# Patient Record
Sex: FEMALE | Race: BLACK OR AFRICAN AMERICAN | NOT HISPANIC OR LATINO | Employment: OTHER | RURAL
[De-identification: names, ages, dates, MRNs, and addresses within clinical notes are randomized per-mention and may not be internally consistent; named-entity substitution may affect disease eponyms.]

---

## 2023-09-17 ENCOUNTER — HOSPITAL ENCOUNTER (EMERGENCY)
Facility: HOSPITAL | Age: 23
Discharge: HOME | End: 2023-09-17
Payer: MEDICAID

## 2023-09-17 VITALS
RESPIRATION RATE: 20 BRPM | BODY MASS INDEX: 19.81 KG/M2 | OXYGEN SATURATION: 100 % | TEMPERATURE: 95 F | DIASTOLIC BLOOD PRESSURE: 92 MMHG | HEART RATE: 89 BPM | HEIGHT: 64 IN | SYSTOLIC BLOOD PRESSURE: 152 MMHG | WEIGHT: 116 LBS

## 2023-09-17 DIAGNOSIS — A08.4 VIRAL GASTROENTERITIS: Primary | ICD-10-CM

## 2023-09-17 LAB
ALBUMIN SERPL BCP-MCNC: 3.8 G/DL (ref 3.5–5)
ALBUMIN/GLOB SERPL: 0.8 {RATIO}
ALP SERPL-CCNC: 111 U/L (ref 52–144)
ALT SERPL W P-5'-P-CCNC: 39 U/L (ref 13–56)
ANION GAP SERPL CALCULATED.3IONS-SCNC: 15 MMOL/L (ref 7–16)
AST SERPL W P-5'-P-CCNC: 23 U/L (ref 15–37)
BACTERIA #/AREA URNS HPF: ABNORMAL /HPF
BASOPHILS # BLD AUTO: 0.01 K/UL (ref 0–0.2)
BASOPHILS NFR BLD AUTO: 0.2 % (ref 0–1)
BILIRUB SERPL-MCNC: 0.3 MG/DL (ref ?–1.2)
BILIRUB UR QL STRIP: NEGATIVE
BUN SERPL-MCNC: 8 MG/DL (ref 7–18)
BUN/CREAT SERPL: 11 (ref 6–20)
CALCIUM SERPL-MCNC: 8.7 MG/DL (ref 8.5–10.1)
CHLORIDE SERPL-SCNC: 97 MMOL/L (ref 98–107)
CLARITY UR: CLEAR
CO2 SERPL-SCNC: 26 MMOL/L (ref 21–32)
COLOR UR: YELLOW
CREAT SERPL-MCNC: 0.72 MG/DL (ref 0.55–1.02)
DIFFERENTIAL METHOD BLD: ABNORMAL
EGFR (NO RACE VARIABLE) (RUSH/TITUS): 123 ML/MIN/1.73M2
EOSINOPHIL # BLD AUTO: 0.17 K/UL (ref 0–0.5)
EOSINOPHIL NFR BLD AUTO: 2.7 % (ref 1–4)
ERYTHROCYTE [DISTWIDTH] IN BLOOD BY AUTOMATED COUNT: 19.5 % (ref 11.5–14.5)
FLUAV AG UPPER RESP QL IA.RAPID: NEGATIVE
FLUBV AG UPPER RESP QL IA.RAPID: NEGATIVE
GLOBULIN SER-MCNC: 4.6 G/DL (ref 2–4)
GLUCOSE SERPL-MCNC: 284 MG/DL (ref 74–106)
GLUCOSE UR STRIP-MCNC: 500 MG/DL
HCT VFR BLD AUTO: 33 % (ref 38–47)
HGB BLD-MCNC: 10.5 G/DL (ref 12–16)
KETONES UR STRIP-SCNC: 40 MG/DL
LACTATE SERPL-SCNC: 2.6 MMOL/L (ref 0.4–2)
LEUKOCYTE ESTERASE UR QL STRIP: NEGATIVE
LYMPHOCYTES # BLD AUTO: 1.32 K/UL (ref 1–4.8)
LYMPHOCYTES NFR BLD AUTO: 21 % (ref 27–41)
MCH RBC QN AUTO: 22.3 PG (ref 27–31)
MCHC RBC AUTO-ENTMCNC: 31.8 G/DL (ref 32–36)
MCV RBC AUTO: 70.2 FL (ref 80–96)
MONOCYTES # BLD AUTO: 0.25 K/UL (ref 0–0.8)
MONOCYTES NFR BLD AUTO: 4 % (ref 2–6)
MPC BLD CALC-MCNC: 10 FL (ref 9.4–12.4)
NEUTROPHILS # BLD AUTO: 4.54 K/UL (ref 1.8–7.7)
NEUTROPHILS NFR BLD AUTO: 72.1 % (ref 53–65)
NITRITE UR QL STRIP: NEGATIVE
PH UR STRIP: 5.5 PH UNITS
PLATELET # BLD AUTO: 216 K/UL (ref 150–400)
POTASSIUM SERPL-SCNC: 3.8 MMOL/L (ref 3.5–5.1)
PROT SERPL-MCNC: 8.4 G/DL (ref 6.4–8.2)
PROT UR QL STRIP: NEGATIVE
RBC # BLD AUTO: 4.7 M/UL (ref 4.2–5.4)
RBC # UR STRIP: ABNORMAL /UL
RBC #/AREA URNS HPF: ABNORMAL /HPF
SARS-COV+SARS-COV-2 AG RESP QL IA.RAPID: NEGATIVE
SODIUM SERPL-SCNC: 134 MMOL/L (ref 136–145)
SP GR UR STRIP: 1.01
UROBILINOGEN UR STRIP-ACNC: 0.2 MG/DL
WBC # BLD AUTO: 6.29 K/UL (ref 4.5–11)
WBC #/AREA URNS HPF: ABNORMAL /HPF

## 2023-09-17 PROCEDURE — 96372 THER/PROPH/DIAG INJ SC/IM: CPT | Performed by: NURSE PRACTITIONER

## 2023-09-17 PROCEDURE — 87426 SARSCOV CORONAVIRUS AG IA: CPT | Performed by: NURSE PRACTITIONER

## 2023-09-17 PROCEDURE — 85025 COMPLETE CBC W/AUTO DIFF WBC: CPT | Performed by: NURSE PRACTITIONER

## 2023-09-17 PROCEDURE — 99284 EMERGENCY DEPT VISIT MOD MDM: CPT

## 2023-09-17 PROCEDURE — 80053 COMPREHEN METABOLIC PANEL: CPT | Performed by: NURSE PRACTITIONER

## 2023-09-17 PROCEDURE — 99284 EMERGENCY DEPT VISIT MOD MDM: CPT | Mod: ,,, | Performed by: NURSE PRACTITIONER

## 2023-09-17 PROCEDURE — 81001 URINALYSIS AUTO W/SCOPE: CPT | Performed by: NURSE PRACTITIONER

## 2023-09-17 PROCEDURE — 87804 INFLUENZA ASSAY W/OPTIC: CPT | Performed by: NURSE PRACTITIONER

## 2023-09-17 PROCEDURE — 83605 ASSAY OF LACTIC ACID: CPT | Performed by: NURSE PRACTITIONER

## 2023-09-17 PROCEDURE — 63600175 PHARM REV CODE 636 W HCPCS: Performed by: NURSE PRACTITIONER

## 2023-09-17 PROCEDURE — 99284 PR EMERGENCY DEPT VISIT,LEVEL IV: ICD-10-PCS | Mod: ,,, | Performed by: NURSE PRACTITIONER

## 2023-09-17 RX ORDER — ONDANSETRON 2 MG/ML
4 INJECTION INTRAMUSCULAR; INTRAVENOUS
Status: COMPLETED | OUTPATIENT
Start: 2023-09-17 | End: 2023-09-17

## 2023-09-17 RX ORDER — INSULIN ASPART 100 [IU]/ML
INJECTION, SOLUTION INTRAVENOUS; SUBCUTANEOUS
COMMUNITY
Start: 2023-08-24

## 2023-09-17 RX ORDER — ESCITALOPRAM OXALATE 10 MG/1
10 TABLET ORAL
COMMUNITY
Start: 2023-09-11

## 2023-09-17 RX ORDER — ATORVASTATIN CALCIUM 40 MG/1
40 TABLET, FILM COATED ORAL NIGHTLY
COMMUNITY
Start: 2023-05-23

## 2023-09-17 RX ORDER — AMITRIPTYLINE HYDROCHLORIDE 10 MG/1
10 TABLET, FILM COATED ORAL NIGHTLY
COMMUNITY
Start: 2023-08-27

## 2023-09-17 RX ORDER — ASPIRIN 81 MG/1
1 TABLET ORAL DAILY
COMMUNITY
Start: 2022-11-16 | End: 2023-11-16

## 2023-09-17 RX ORDER — ONDANSETRON 2 MG/ML
4 INJECTION INTRAMUSCULAR; INTRAVENOUS
Status: DISCONTINUED | OUTPATIENT
Start: 2023-09-17 | End: 2023-09-17

## 2023-09-17 RX ORDER — GABAPENTIN 300 MG/1
300 CAPSULE ORAL NIGHTLY
COMMUNITY
Start: 2023-08-27

## 2023-09-17 RX ORDER — INSULIN GLARGINE 100 [IU]/ML
INJECTION, SOLUTION SUBCUTANEOUS
COMMUNITY
Start: 2023-06-30

## 2023-09-17 RX ORDER — ONDANSETRON 4 MG/1
4 TABLET, ORALLY DISINTEGRATING ORAL EVERY 6 HOURS PRN
Qty: 12 TABLET | Refills: 0 | Status: SHIPPED | OUTPATIENT
Start: 2023-09-17

## 2023-09-17 RX ORDER — BACLOFEN 10 MG/1
10 TABLET ORAL NIGHTLY
COMMUNITY
Start: 2023-07-27

## 2023-09-17 RX ADMIN — ONDANSETRON 4 MG: 2 INJECTION INTRAMUSCULAR; INTRAVENOUS at 12:09

## 2023-09-17 NOTE — ED PROVIDER NOTES
Encounter Date: 9/17/2023       History     Chief Complaint   Patient presents with    Nausea    Vomiting    Abdominal Pain     23 y/o AAF presents per mother with c/o n/v with onset one hour pta. Has vomited x 4 and complaining of upper abdominal pain. Mom states she checked patient's BS at home and it was 390. Mom treated with Lantus 15 units and Novolog 12 units pta to ED. Mom states BS sometimes not well-controlled. Patient currently on monthly cycle.      Review of patient's allergies indicates:  No Known Allergies  Past Medical History:   Diagnosis Date    CVA (cerebral vascular accident) 07/27/2022    Diabetes mellitus     Left-sided weakness      Past Surgical History:   Procedure Laterality Date    CRANIOTOMY  07/2022     History reviewed. No pertinent family history.  Social History     Tobacco Use    Smoking status: Every Day     Current packs/day: 0.50     Types: Cigarettes    Smokeless tobacco: Never   Substance Use Topics    Alcohol use: Never    Drug use: Yes     Frequency: 14.0 times per week     Types: Marijuana     Review of Systems   Constitutional:  Negative for chills and fever.   Respiratory:  Negative for apnea, cough, choking, chest tightness, shortness of breath, wheezing and stridor.    Cardiovascular:  Negative for chest pain, palpitations and leg swelling.   Gastrointestinal:  Positive for abdominal pain, nausea and vomiting. Negative for abdominal distention, anal bleeding, blood in stool, constipation, diarrhea and rectal pain.   All other systems reviewed and are negative.      Physical Exam     Initial Vitals [09/17/23 1210]   BP Pulse Resp Temp SpO2   (!) 163/117 89 20 (!) 94.5 °F (34.7 °C) 100 %      MAP       --         Physical Exam    Vitals reviewed.  Constitutional: She appears well-developed and well-nourished. She is not diaphoretic. No distress.   HENT:   Head: Normocephalic.   Right Ear: Tympanic membrane and ear canal normal.   Left Ear: Tympanic membrane and ear canal  normal.   Nose: Nose normal. Right sinus exhibits no maxillary sinus tenderness and no frontal sinus tenderness. Left sinus exhibits no maxillary sinus tenderness and no frontal sinus tenderness.   Mouth/Throat: Uvula is midline, oropharynx is clear and moist and mucous membranes are normal.   Eyes: Conjunctivae and EOM are normal. No scleral icterus.   Neck: Neck supple.   Normal range of motion.  Cardiovascular:  Normal rate, regular rhythm, normal heart sounds and intact distal pulses.     Exam reveals no gallop and no friction rub.       No murmur heard.  Pulmonary/Chest: Breath sounds normal. No respiratory distress. She has no wheezes. She has no rhonchi. She has no rales. She exhibits no tenderness.   Abdominal: Abdomen is soft. Bowel sounds are normal. She exhibits no distension and no mass. There is no abdominal tenderness. There is no rebound and no guarding.   Musculoskeletal:         General: No tenderness. Normal range of motion.      Cervical back: Normal range of motion and neck supple.     Lymphadenopathy:     She has no cervical adenopathy.   Neurological: She is alert and oriented to person, place, and time. She has normal strength.   Skin: Skin is warm and dry. Capillary refill takes less than 2 seconds.   Psychiatric: She has a normal mood and affect. Her behavior is normal. Judgment and thought content normal.         Medical Screening Exam   See Full Note    ED Course   Procedures  Labs Reviewed   COMPREHENSIVE METABOLIC PANEL - Abnormal; Notable for the following components:       Result Value    Sodium 134 (*)     Chloride 97 (*)     Glucose 284 (*)     Total Protein 8.4 (*)     Globulin 4.6 (*)     All other components within normal limits   CBC WITH DIFFERENTIAL - Abnormal; Notable for the following components:    Hemoglobin 10.5 (*)     Hematocrit 33.0 (*)     MCV 70.2 (*)     MCH 22.3 (*)     MCHC 31.8 (*)     RDW 19.5 (*)     Neutrophils % 72.1 (*)     Lymphocytes % 21.0 (*)     All  other components within normal limits   LACTIC ACID, PLASMA - Abnormal; Notable for the following components:    Lactic Acid 2.6 (*)     All other components within normal limits   URINALYSIS, REFLEX TO URINE CULTURE - Abnormal; Notable for the following components:    Glucose,  (*)     Ketones, UA 40 (*)     Blood, UA Large (*)     All other components within normal limits   URINALYSIS, MICROSCOPIC - Abnormal; Notable for the following components:    RBC, UA Too Numerous To Count (*)     All other components within normal limits   RAPID INFLUENZA A/B - Normal   SARS ANTIGEN(VAISHNAVI) - Normal    Narrative:     Negative SARS-CoV results should not be used as the sole basis for treatment or patient management decisions; negative results should be considered in the context of a patient's recent exposures, history and the presene of clinical signs and symptoms consistent with COVID-19.  Negative results should be treated as presumptive and confirmed by molecular assay, if necessary for patient management.   CBC W/ AUTO DIFFERENTIAL    Narrative:     The following orders were created for panel order CBC auto differential.  Procedure                               Abnormality         Status                     ---------                               -----------         ------                     CBC with Differential[[id number]]       Abnormal            Final result                 Please view results for these tests on the individual orders.   LACTIC ACID, PLASMA          Imaging Results    None          Medications   ondansetron injection 4 mg (4 mg Intramuscular Given 9/17/23 1229)     Medical Decision Making  23 y/o AAF presents per mother with c/o n/v with onset one hour pta. Has vomited x 4 and complaining of upper abdominal pain. Mom states she checked patient's BS at home and it was 390. Mom treated with Lantus 15 units and Novolog 12 units pta to ED. Mom states BS sometimes not well-controlled. Patient  currently on monthly cycle.    Amount and/or Complexity of Data Reviewed  Labs: ordered.     Details: CBC: H/H 10.5/33  CMP: Sodium 134, Glucose 284  Lactate: 2.6  Covid: Negative  Flu: Negative    Risk  Prescription drug management.                               Clinical Impression:   Final diagnoses:  [A08.4] Viral gastroenteritis (Primary)        ED Disposition Condition    Discharge Stable          ED Prescriptions       Medication Sig Dispense Start Date End Date Auth. Provider    ondansetron (ZOFRAN-ODT) 4 MG TbDL Take 1 tablet (4 mg total) by mouth every 6 (six) hours as needed (nausea). 12 tablet 9/17/2023 -- Rashaad Garcia FNP          Follow-up Information    None          Rashaad Garcia FNP  09/17/23 1322       Rashaad Garcia FNP  09/17/23 1411

## 2023-09-17 NOTE — ED TRIAGE NOTES
Pt presents to the ED via POV w/ c/o N/V and abdominal pain that started 1 hour ago. Pt states she is on her monthly cycle.

## 2024-07-20 ENCOUNTER — HOSPITAL ENCOUNTER (EMERGENCY)
Facility: HOSPITAL | Age: 24
Discharge: ANOTHER HOSPITAL | End: 2024-07-21
Payer: MEDICAID

## 2024-07-20 DIAGNOSIS — E86.0 DEHYDRATION: ICD-10-CM

## 2024-07-20 DIAGNOSIS — R10.9 ABDOMINAL PAIN, UNSPECIFIED ABDOMINAL LOCATION: Primary | ICD-10-CM

## 2024-07-20 DIAGNOSIS — R11.10 VOMITING, UNSPECIFIED VOMITING TYPE, UNSPECIFIED WHETHER NAUSEA PRESENT: ICD-10-CM

## 2024-07-20 DIAGNOSIS — E10.10 TYPE 1 DIABETES MELLITUS WITH KETOACIDOSIS WITHOUT COMA: ICD-10-CM

## 2024-07-20 DIAGNOSIS — R73.9 HYPERGLYCEMIA: ICD-10-CM

## 2024-07-20 LAB
ALBUMIN SERPL BCP-MCNC: 4.9 G/DL (ref 3.5–5)
ALBUMIN/GLOB SERPL: 1 {RATIO}
ALP SERPL-CCNC: 134 U/L (ref 52–144)
ALT SERPL W P-5'-P-CCNC: 58 U/L (ref 13–56)
ANION GAP SERPL CALCULATED.3IONS-SCNC: 29 MMOL/L (ref 7–16)
AST SERPL W P-5'-P-CCNC: 43 U/L (ref 15–37)
BACTERIA #/AREA URNS HPF: ABNORMAL /HPF
BASOPHILS # BLD AUTO: 0.02 K/UL (ref 0–0.2)
BASOPHILS NFR BLD AUTO: 0.2 % (ref 0–1)
BILIRUB SERPL-MCNC: 0.5 MG/DL (ref ?–1.2)
BILIRUB UR QL STRIP: NEGATIVE
BUN SERPL-MCNC: 15 MG/DL (ref 7–18)
BUN/CREAT SERPL: 16 (ref 6–20)
CALCIUM SERPL-MCNC: 9.9 MG/DL (ref 8.5–10.1)
CHLORIDE SERPL-SCNC: 97 MMOL/L (ref 98–107)
CLARITY UR: ABNORMAL
CO2 SERPL-SCNC: 18 MMOL/L (ref 21–32)
COLOR UR: ABNORMAL
CREAT SERPL-MCNC: 0.95 MG/DL (ref 0.55–1.02)
DIFFERENTIAL METHOD BLD: ABNORMAL
EGFR (NO RACE VARIABLE) (RUSH/TITUS): 88 ML/MIN/1.73M2
EOSINOPHIL # BLD AUTO: 0 K/UL (ref 0–0.5)
EOSINOPHIL NFR BLD AUTO: 0 % (ref 1–4)
ERYTHROCYTE [DISTWIDTH] IN BLOOD BY AUTOMATED COUNT: 20 % (ref 11.5–14.5)
GLOBULIN SER-MCNC: 4.9 G/DL (ref 2–4)
GLUCOSE SERPL-MCNC: 210 MG/DL (ref 74–106)
GLUCOSE UR STRIP-MCNC: 500 MG/DL
HCG UR QL IA.RAPID: NEGATIVE
HCO3 UR-SCNC: 13.6 MMOL/L (ref 21–28)
HCT VFR BLD AUTO: 41.3 % (ref 38–47)
HGB BLD-MCNC: 13 G/DL (ref 12–16)
KETONES UR STRIP-SCNC: >=160 MG/DL
LACTATE SERPL-SCNC: 2.9 MMOL/L (ref 0.4–2)
LACTATE SERPL-SCNC: 4.2 MMOL/L (ref 0.4–2)
LEUKOCYTE ESTERASE UR QL STRIP: NEGATIVE
LIPASE SERPL-CCNC: 9 U/L (ref 16–77)
LYMPHOCYTES # BLD AUTO: 1.03 K/UL (ref 1–4.8)
LYMPHOCYTES NFR BLD AUTO: 7.8 % (ref 27–41)
MCH RBC QN AUTO: 22.5 PG (ref 27–31)
MCHC RBC AUTO-ENTMCNC: 31.5 G/DL (ref 32–36)
MCV RBC AUTO: 71.6 FL (ref 80–96)
MONOCYTES # BLD AUTO: 0.21 K/UL (ref 0–0.8)
MONOCYTES NFR BLD AUTO: 1.6 % (ref 2–6)
MPC BLD CALC-MCNC: 9.9 FL (ref 9.4–12.4)
NEUTROPHILS # BLD AUTO: 12.03 K/UL (ref 1.8–7.7)
NEUTROPHILS NFR BLD AUTO: 90.4 % (ref 53–65)
NITRITE UR QL STRIP: NEGATIVE
PCO2 BLDA: 29 MMHG (ref 35–48)
PH SMN: 7.28 [PH] (ref 7.35–7.45)
PH UR STRIP: 5.5 PH UNITS
PLATELET # BLD AUTO: 266 K/UL (ref 150–400)
PO2 BLDA: 106 MMHG (ref 83–108)
POC BASE EXCESS: -11.8 MMOL/L (ref -2–3)
POC SATURATED O2: 97 %
POTASSIUM SERPL-SCNC: 4.6 MMOL/L (ref 3.5–5.1)
PROT SERPL-MCNC: 9.8 G/DL (ref 6.4–8.2)
PROT UR QL STRIP: 100
RBC # BLD AUTO: 5.77 M/UL (ref 4.2–5.4)
RBC # UR STRIP: ABNORMAL /UL
RBC #/AREA URNS HPF: ABNORMAL /HPF
SODIUM SERPL-SCNC: 139 MMOL/L (ref 136–145)
SP GR UR STRIP: 1.02
SQUAMOUS #/AREA URNS LPF: ABNORMAL /LPF
UROBILINOGEN UR STRIP-ACNC: 0.2 MG/DL
WBC # BLD AUTO: 13.29 K/UL (ref 4.5–11)
WBC #/AREA URNS HPF: ABNORMAL /HPF

## 2024-07-20 PROCEDURE — 36600 WITHDRAWAL OF ARTERIAL BLOOD: CPT

## 2024-07-20 PROCEDURE — 85025 COMPLETE CBC W/AUTO DIFF WBC: CPT | Performed by: NURSE PRACTITIONER

## 2024-07-20 PROCEDURE — 82803 BLOOD GASES ANY COMBINATION: CPT

## 2024-07-20 PROCEDURE — 83690 ASSAY OF LIPASE: CPT | Performed by: NURSE PRACTITIONER

## 2024-07-20 PROCEDURE — 99900035 HC TECH TIME PER 15 MIN (STAT)

## 2024-07-20 PROCEDURE — 96375 TX/PRO/DX INJ NEW DRUG ADDON: CPT

## 2024-07-20 PROCEDURE — 87077 CULTURE AEROBIC IDENTIFY: CPT | Performed by: NURSE PRACTITIONER

## 2024-07-20 PROCEDURE — 94761 N-INVAS EAR/PLS OXIMETRY MLT: CPT | Mod: XB

## 2024-07-20 PROCEDURE — 25000003 PHARM REV CODE 250: Performed by: NURSE PRACTITIONER

## 2024-07-20 PROCEDURE — 96361 HYDRATE IV INFUSION ADD-ON: CPT

## 2024-07-20 PROCEDURE — 81003 URINALYSIS AUTO W/O SCOPE: CPT | Performed by: NURSE PRACTITIONER

## 2024-07-20 PROCEDURE — 80053 COMPREHEN METABOLIC PANEL: CPT | Performed by: NURSE PRACTITIONER

## 2024-07-20 PROCEDURE — 99285 EMERGENCY DEPT VISIT HI MDM: CPT | Mod: 25

## 2024-07-20 PROCEDURE — 96365 THER/PROPH/DIAG IV INF INIT: CPT

## 2024-07-20 PROCEDURE — 81025 URINE PREGNANCY TEST: CPT | Performed by: NURSE PRACTITIONER

## 2024-07-20 PROCEDURE — 93010 ELECTROCARDIOGRAM REPORT: CPT | Mod: ,,, | Performed by: INTERNAL MEDICINE

## 2024-07-20 PROCEDURE — 82962 GLUCOSE BLOOD TEST: CPT

## 2024-07-20 PROCEDURE — 96376 TX/PRO/DX INJ SAME DRUG ADON: CPT

## 2024-07-20 PROCEDURE — 99285 EMERGENCY DEPT VISIT HI MDM: CPT | Mod: ,,, | Performed by: NURSE PRACTITIONER

## 2024-07-20 PROCEDURE — 93005 ELECTROCARDIOGRAM TRACING: CPT

## 2024-07-20 PROCEDURE — 63600175 PHARM REV CODE 636 W HCPCS: Performed by: NURSE PRACTITIONER

## 2024-07-20 PROCEDURE — 83605 ASSAY OF LACTIC ACID: CPT | Performed by: NURSE PRACTITIONER

## 2024-07-20 PROCEDURE — 96374 THER/PROPH/DIAG INJ IV PUSH: CPT

## 2024-07-20 PROCEDURE — 81001 URINALYSIS AUTO W/SCOPE: CPT | Performed by: NURSE PRACTITIONER

## 2024-07-20 PROCEDURE — 87186 SC STD MICRODIL/AGAR DIL: CPT | Performed by: NURSE PRACTITIONER

## 2024-07-20 PROCEDURE — 87086 URINE CULTURE/COLONY COUNT: CPT | Performed by: NURSE PRACTITIONER

## 2024-07-20 RX ORDER — ONDANSETRON HYDROCHLORIDE 2 MG/ML
4 INJECTION, SOLUTION INTRAVENOUS
Status: COMPLETED | OUTPATIENT
Start: 2024-07-20 | End: 2024-07-20

## 2024-07-20 RX ORDER — MORPHINE SULFATE 4 MG/ML
2 INJECTION, SOLUTION INTRAMUSCULAR; INTRAVENOUS
Status: COMPLETED | OUTPATIENT
Start: 2024-07-20 | End: 2024-07-20

## 2024-07-20 RX ORDER — MORPHINE SULFATE 4 MG/ML
2 INJECTION, SOLUTION INTRAMUSCULAR; INTRAVENOUS
Status: COMPLETED | OUTPATIENT
Start: 2024-07-21 | End: 2024-07-20

## 2024-07-20 RX ADMIN — PROMETHAZINE HYDROCHLORIDE 12.5 MG: 25 INJECTION INTRAMUSCULAR; INTRAVENOUS at 11:07

## 2024-07-20 RX ADMIN — ONDANSETRON 4 MG: 2 INJECTION INTRAMUSCULAR; INTRAVENOUS at 09:07

## 2024-07-20 RX ADMIN — MORPHINE SULFATE 2 MG: 4 INJECTION INTRAVENOUS at 10:07

## 2024-07-20 RX ADMIN — MORPHINE SULFATE 2 MG: 4 INJECTION INTRAVENOUS at 11:07

## 2024-07-20 RX ADMIN — SODIUM CHLORIDE 500 ML: 9 INJECTION, SOLUTION INTRAVENOUS at 11:07

## 2024-07-20 RX ADMIN — SODIUM CHLORIDE 500 ML: 9 INJECTION, SOLUTION INTRAVENOUS at 09:07

## 2024-07-20 RX ADMIN — SODIUM CHLORIDE 500 ML: 9 INJECTION, SOLUTION INTRAVENOUS at 10:07

## 2024-07-21 ENCOUNTER — HOSPITAL ENCOUNTER (INPATIENT)
Facility: HOSPITAL | Age: 24
LOS: 2 days | Discharge: HOME | End: 2024-07-23
Attending: INTERNAL MEDICINE | Admitting: INTERNAL MEDICINE
Payer: MEDICAID

## 2024-07-21 VITALS
WEIGHT: 125 LBS | DIASTOLIC BLOOD PRESSURE: 84 MMHG | HEART RATE: 84 BPM | SYSTOLIC BLOOD PRESSURE: 157 MMHG | BODY MASS INDEX: 21.34 KG/M2 | RESPIRATION RATE: 16 BRPM | TEMPERATURE: 98 F | HEIGHT: 64 IN | OXYGEN SATURATION: 100 %

## 2024-07-21 DIAGNOSIS — R11.14 BILIOUS VOMITING WITH NAUSEA: ICD-10-CM

## 2024-07-21 DIAGNOSIS — I63.9 ISCHEMIC STROKE: ICD-10-CM

## 2024-07-21 DIAGNOSIS — E10.10 DIABETIC KETOACIDOSIS WITHOUT COMA ASSOCIATED WITH TYPE 1 DIABETES MELLITUS: Primary | ICD-10-CM

## 2024-07-21 DIAGNOSIS — E11.10 DKA (DIABETIC KETOACIDOSIS): ICD-10-CM

## 2024-07-21 DIAGNOSIS — I10 ESSENTIAL HYPERTENSION: ICD-10-CM

## 2024-07-21 DIAGNOSIS — E87.20 LACTIC ACIDOSIS: ICD-10-CM

## 2024-07-21 DIAGNOSIS — E10.9 TYPE 1 DIABETES MELLITUS WITHOUT COMPLICATION: ICD-10-CM

## 2024-07-21 PROBLEM — R11.2 NAUSEA AND VOMITING: Status: ACTIVE | Noted: 2024-07-21

## 2024-07-21 LAB
ANION GAP SERPL CALCULATED.3IONS-SCNC: 13 MMOL/L (ref 7–16)
ANION GAP SERPL CALCULATED.3IONS-SCNC: 14 MMOL/L (ref 7–16)
ANION GAP SERPL CALCULATED.3IONS-SCNC: 27 MMOL/L (ref 7–16)
BUN SERPL-MCNC: 12 MG/DL (ref 7–18)
BUN SERPL-MCNC: 13 MG/DL (ref 7–18)
BUN SERPL-MCNC: 15 MG/DL (ref 7–18)
BUN/CREAT SERPL: 17 (ref 6–20)
BUN/CREAT SERPL: 18 (ref 6–20)
BUN/CREAT SERPL: 18 (ref 6–20)
CALCIUM SERPL-MCNC: 8.8 MG/DL (ref 8.5–10.1)
CALCIUM SERPL-MCNC: 8.9 MG/DL (ref 8.5–10.1)
CALCIUM SERPL-MCNC: 9.2 MG/DL (ref 8.5–10.1)
CHLORIDE SERPL-SCNC: 100 MMOL/L (ref 98–107)
CHLORIDE SERPL-SCNC: 109 MMOL/L (ref 98–107)
CHLORIDE SERPL-SCNC: 111 MMOL/L (ref 98–107)
CO2 SERPL-SCNC: 17 MMOL/L (ref 21–32)
CO2 SERPL-SCNC: 17 MMOL/L (ref 21–32)
CO2 SERPL-SCNC: 18 MMOL/L (ref 21–32)
CREAT SERPL-MCNC: 0.66 MG/DL (ref 0.55–1.02)
CREAT SERPL-MCNC: 0.71 MG/DL (ref 0.55–1.02)
CREAT SERPL-MCNC: 0.89 MG/DL (ref 0.55–1.02)
EGFR (NO RACE VARIABLE) (RUSH/TITUS): 124 ML/MIN/1.73M2
EGFR (NO RACE VARIABLE) (RUSH/TITUS): 128 ML/MIN/1.73M2
EGFR (NO RACE VARIABLE) (RUSH/TITUS): 95 ML/MIN/1.73M2
GLUCOSE SERPL-MCNC: 150 MG/DL (ref 70–105)
GLUCOSE SERPL-MCNC: 163 MG/DL (ref 74–106)
GLUCOSE SERPL-MCNC: 170 MG/DL (ref 70–105)
GLUCOSE SERPL-MCNC: 189 MG/DL (ref 70–105)
GLUCOSE SERPL-MCNC: 194 MG/DL (ref 70–105)
GLUCOSE SERPL-MCNC: 196 MG/DL (ref 70–105)
GLUCOSE SERPL-MCNC: 204 MG/DL (ref 74–106)
GLUCOSE SERPL-MCNC: 205 MG/DL (ref 70–105)
GLUCOSE SERPL-MCNC: 208 MG/DL (ref 74–106)
HCO3 UR-SCNC: 16 MMOL/L (ref 24–28)
LACTATE SERPL-SCNC: 2.4 MMOL/L (ref 0.4–2)
MAGNESIUM SERPL-MCNC: 1.9 MG/DL (ref 1.7–2.3)
PCO2 BLDA: 40 MMHG (ref 41–51)
PH SMN: 7.21 [PH] (ref 7.32–7.42)
PHOSPHATE SERPL-MCNC: 2.9 MG/DL (ref 2.5–4.5)
PO2 BLDA: 29 MMHG (ref 25–40)
POC BASE EXCESS: -11.3 MMOL/L (ref -2–3)
POC SATURATED O2: 40 %
POTASSIUM SERPL-SCNC: 4.2 MMOL/L (ref 3.5–5.1)
POTASSIUM SERPL-SCNC: 4.8 MMOL/L (ref 3.5–5.1)
POTASSIUM SERPL-SCNC: 5.2 MMOL/L (ref 3.5–5.1)
SODIUM SERPL-SCNC: 136 MMOL/L (ref 136–145)
SODIUM SERPL-SCNC: 137 MMOL/L (ref 136–145)
SODIUM SERPL-SCNC: 139 MMOL/L (ref 136–145)

## 2024-07-21 PROCEDURE — 25000003 PHARM REV CODE 250: Performed by: NURSE PRACTITIONER

## 2024-07-21 PROCEDURE — 80048 BASIC METABOLIC PNL TOTAL CA: CPT | Performed by: INTERNAL MEDICINE

## 2024-07-21 PROCEDURE — 20000000 HC ICU ROOM

## 2024-07-21 PROCEDURE — 83735 ASSAY OF MAGNESIUM: CPT | Performed by: INTERNAL MEDICINE

## 2024-07-21 PROCEDURE — 96361 HYDRATE IV INFUSION ADD-ON: CPT

## 2024-07-21 PROCEDURE — 82962 GLUCOSE BLOOD TEST: CPT

## 2024-07-21 PROCEDURE — 82803 BLOOD GASES ANY COMBINATION: CPT

## 2024-07-21 PROCEDURE — 80048 BASIC METABOLIC PNL TOTAL CA: CPT | Performed by: NURSE PRACTITIONER

## 2024-07-21 PROCEDURE — 96375 TX/PRO/DX INJ NEW DRUG ADDON: CPT

## 2024-07-21 PROCEDURE — 63600175 PHARM REV CODE 636 W HCPCS: Performed by: INTERNAL MEDICINE

## 2024-07-21 PROCEDURE — 36415 COLL VENOUS BLD VENIPUNCTURE: CPT | Performed by: INTERNAL MEDICINE

## 2024-07-21 PROCEDURE — 99223 1ST HOSP IP/OBS HIGH 75: CPT | Mod: ,,, | Performed by: INTERNAL MEDICINE

## 2024-07-21 PROCEDURE — 83605 ASSAY OF LACTIC ACID: CPT | Performed by: NURSE PRACTITIONER

## 2024-07-21 PROCEDURE — 99232 SBSQ HOSP IP/OBS MODERATE 35: CPT | Mod: ,,, | Performed by: INTERNAL MEDICINE

## 2024-07-21 PROCEDURE — S5010 5% DEXTROSE AND 0.45% SALINE: HCPCS | Performed by: NURSE PRACTITIONER

## 2024-07-21 PROCEDURE — 96374 THER/PROPH/DIAG INJ IV PUSH: CPT | Mod: 59

## 2024-07-21 PROCEDURE — 63600175 PHARM REV CODE 636 W HCPCS: Performed by: NURSE PRACTITIONER

## 2024-07-21 PROCEDURE — 96365 THER/PROPH/DIAG IV INF INIT: CPT | Mod: 59

## 2024-07-21 PROCEDURE — 84100 ASSAY OF PHOSPHORUS: CPT | Performed by: INTERNAL MEDICINE

## 2024-07-21 PROCEDURE — 96366 THER/PROPH/DIAG IV INF ADDON: CPT

## 2024-07-21 PROCEDURE — 25000003 PHARM REV CODE 250: Performed by: INTERNAL MEDICINE

## 2024-07-21 RX ORDER — SODIUM CHLORIDE 0.9 % (FLUSH) 0.9 %
10 SYRINGE (ML) INJECTION
Status: DISCONTINUED | OUTPATIENT
Start: 2024-07-21 | End: 2024-07-21 | Stop reason: HOSPADM

## 2024-07-21 RX ORDER — GLUCAGON 1 MG
1 KIT INJECTION
Status: DISCONTINUED | OUTPATIENT
Start: 2024-07-21 | End: 2024-07-21

## 2024-07-21 RX ORDER — DEXTROSE MONOHYDRATE 100 MG/ML
INJECTION, SOLUTION INTRAVENOUS
Status: DISCONTINUED | OUTPATIENT
Start: 2024-07-21 | End: 2024-07-21

## 2024-07-21 RX ORDER — AMITRIPTYLINE HYDROCHLORIDE 10 MG/1
10 TABLET, FILM COATED ORAL NIGHTLY
Status: DISCONTINUED | OUTPATIENT
Start: 2024-07-21 | End: 2024-07-23 | Stop reason: HOSPADM

## 2024-07-21 RX ORDER — ASPIRIN 81 MG/1
81 TABLET ORAL DAILY
Status: DISCONTINUED | OUTPATIENT
Start: 2024-07-21 | End: 2024-07-23 | Stop reason: HOSPADM

## 2024-07-21 RX ORDER — ATORVASTATIN CALCIUM 40 MG/1
40 TABLET, FILM COATED ORAL NIGHTLY
Status: DISCONTINUED | OUTPATIENT
Start: 2024-07-21 | End: 2024-07-23 | Stop reason: HOSPADM

## 2024-07-21 RX ORDER — IBUPROFEN 200 MG
16 TABLET ORAL
Status: DISCONTINUED | OUTPATIENT
Start: 2024-07-21 | End: 2024-07-21

## 2024-07-21 RX ORDER — GABAPENTIN 300 MG/1
300 CAPSULE ORAL NIGHTLY
Status: DISCONTINUED | OUTPATIENT
Start: 2024-07-21 | End: 2024-07-23 | Stop reason: HOSPADM

## 2024-07-21 RX ORDER — PROMETHAZINE HYDROCHLORIDE 25 MG/ML
25 INJECTION, SOLUTION INTRAMUSCULAR; INTRAVENOUS EVERY 6 HOURS PRN
Status: DISCONTINUED | OUTPATIENT
Start: 2024-07-21 | End: 2024-07-23 | Stop reason: HOSPADM

## 2024-07-21 RX ORDER — DEXTROSE MONOHYDRATE 100 MG/ML
INJECTION, SOLUTION INTRAVENOUS
Status: DISCONTINUED | OUTPATIENT
Start: 2024-07-21 | End: 2024-07-21 | Stop reason: HOSPADM

## 2024-07-21 RX ORDER — SODIUM CHLORIDE 0.9 % (FLUSH) 0.9 %
10 SYRINGE (ML) INJECTION
Status: DISCONTINUED | OUTPATIENT
Start: 2024-07-21 | End: 2024-07-23 | Stop reason: HOSPADM

## 2024-07-21 RX ORDER — INSULIN ASPART 100 [IU]/ML
0-10 INJECTION, SOLUTION INTRAVENOUS; SUBCUTANEOUS
Status: DISCONTINUED | OUTPATIENT
Start: 2024-07-21 | End: 2024-07-23 | Stop reason: HOSPADM

## 2024-07-21 RX ORDER — SODIUM CHLORIDE 9 MG/ML
INJECTION, SOLUTION INTRAVENOUS CONTINUOUS
Status: DISCONTINUED | OUTPATIENT
Start: 2024-07-21 | End: 2024-07-21

## 2024-07-21 RX ORDER — IBUPROFEN 200 MG
16 TABLET ORAL
Status: DISCONTINUED | OUTPATIENT
Start: 2024-07-21 | End: 2024-07-23 | Stop reason: HOSPADM

## 2024-07-21 RX ORDER — HYDROCODONE BITARTRATE AND ACETAMINOPHEN 5; 325 MG/1; MG/1
1 TABLET ORAL EVERY 6 HOURS PRN
Status: DISCONTINUED | OUTPATIENT
Start: 2024-07-21 | End: 2024-07-23 | Stop reason: HOSPADM

## 2024-07-21 RX ORDER — MORPHINE SULFATE 2 MG/ML
2 INJECTION, SOLUTION INTRAMUSCULAR; INTRAVENOUS EVERY 4 HOURS PRN
Status: DISCONTINUED | OUTPATIENT
Start: 2024-07-21 | End: 2024-07-21

## 2024-07-21 RX ORDER — DEXTROSE MONOHYDRATE AND SODIUM CHLORIDE 5; .45 G/100ML; G/100ML
INJECTION, SOLUTION INTRAVENOUS CONTINUOUS PRN
Status: DISCONTINUED | OUTPATIENT
Start: 2024-07-21 | End: 2024-07-21

## 2024-07-21 RX ORDER — ONDANSETRON HYDROCHLORIDE 2 MG/ML
4 INJECTION, SOLUTION INTRAVENOUS EVERY 6 HOURS PRN
Status: DISCONTINUED | OUTPATIENT
Start: 2024-07-21 | End: 2024-07-23 | Stop reason: HOSPADM

## 2024-07-21 RX ORDER — GLUCAGON 1 MG
1 KIT INJECTION
Status: DISCONTINUED | OUTPATIENT
Start: 2024-07-21 | End: 2024-07-23 | Stop reason: HOSPADM

## 2024-07-21 RX ORDER — IBUPROFEN 200 MG
24 TABLET ORAL
Status: DISCONTINUED | OUTPATIENT
Start: 2024-07-21 | End: 2024-07-21

## 2024-07-21 RX ORDER — MUPIROCIN 20 MG/G
OINTMENT TOPICAL 2 TIMES DAILY
Status: DISCONTINUED | OUTPATIENT
Start: 2024-07-21 | End: 2024-07-23 | Stop reason: HOSPADM

## 2024-07-21 RX ORDER — ENOXAPARIN SODIUM 100 MG/ML
40 INJECTION SUBCUTANEOUS EVERY 24 HOURS
Status: DISCONTINUED | OUTPATIENT
Start: 2024-07-21 | End: 2024-07-23 | Stop reason: HOSPADM

## 2024-07-21 RX ORDER — BACLOFEN 10 MG/1
10 TABLET ORAL NIGHTLY
Status: DISCONTINUED | OUTPATIENT
Start: 2024-07-21 | End: 2024-07-23 | Stop reason: HOSPADM

## 2024-07-21 RX ORDER — ACETAMINOPHEN 325 MG/1
650 TABLET ORAL EVERY 6 HOURS PRN
Status: DISCONTINUED | OUTPATIENT
Start: 2024-07-21 | End: 2024-07-23 | Stop reason: HOSPADM

## 2024-07-21 RX ORDER — IBUPROFEN 200 MG
24 TABLET ORAL
Status: DISCONTINUED | OUTPATIENT
Start: 2024-07-21 | End: 2024-07-23 | Stop reason: HOSPADM

## 2024-07-21 RX ORDER — DEXTROSE MONOHYDRATE AND SODIUM CHLORIDE 5; .45 G/100ML; G/100ML
INJECTION, SOLUTION INTRAVENOUS CONTINUOUS PRN
Status: DISCONTINUED | OUTPATIENT
Start: 2024-07-21 | End: 2024-07-21 | Stop reason: HOSPADM

## 2024-07-21 RX ORDER — DIPHENHYDRAMINE HCL 25 MG
25 CAPSULE ORAL EVERY 6 HOURS PRN
Status: DISCONTINUED | OUTPATIENT
Start: 2024-07-21 | End: 2024-07-23 | Stop reason: HOSPADM

## 2024-07-21 RX ORDER — SODIUM CHLORIDE 9 MG/ML
INJECTION, SOLUTION INTRAVENOUS CONTINUOUS
Status: DISCONTINUED | OUTPATIENT
Start: 2024-07-21 | End: 2024-07-22

## 2024-07-21 RX ORDER — LISINOPRIL 20 MG/1
20 TABLET ORAL DAILY
Status: DISCONTINUED | OUTPATIENT
Start: 2024-07-21 | End: 2024-07-23 | Stop reason: HOSPADM

## 2024-07-21 RX ORDER — INSULIN GLARGINE 100 [IU]/ML
16 INJECTION, SOLUTION SUBCUTANEOUS NIGHTLY
Status: DISCONTINUED | OUTPATIENT
Start: 2024-07-21 | End: 2024-07-22

## 2024-07-21 RX ADMIN — ONDANSETRON 4 MG: 2 INJECTION INTRAMUSCULAR; INTRAVENOUS at 09:07

## 2024-07-21 RX ADMIN — DEXTROSE AND SODIUM CHLORIDE: 5; 450 INJECTION, SOLUTION INTRAVENOUS at 01:07

## 2024-07-21 RX ADMIN — LISINOPRIL 20 MG: 20 TABLET ORAL at 08:07

## 2024-07-21 RX ADMIN — AMITRIPTYLINE HYDROCHLORIDE 10 MG: 10 TABLET, FILM COATED ORAL at 08:07

## 2024-07-21 RX ADMIN — INSULIN HUMAN 0.05 UNITS/KG/HR: 1 INJECTION, SOLUTION INTRAVENOUS at 01:07

## 2024-07-21 RX ADMIN — INSULIN GLARGINE 16 UNITS: 100 INJECTION, SOLUTION SUBCUTANEOUS at 08:07

## 2024-07-21 RX ADMIN — SODIUM CHLORIDE 1000 ML: 9 INJECTION, SOLUTION INTRAVENOUS at 09:07

## 2024-07-21 RX ADMIN — ACETAMINOPHEN 650 MG: 325 TABLET ORAL at 03:07

## 2024-07-21 RX ADMIN — INSULIN ASPART 4 UNITS: 100 INJECTION, SOLUTION INTRAVENOUS; SUBCUTANEOUS at 08:07

## 2024-07-21 RX ADMIN — BACLOFEN 10 MG: 10 TABLET ORAL at 08:07

## 2024-07-21 RX ADMIN — INSULIN ASPART 4 UNITS: 100 INJECTION, SOLUTION INTRAVENOUS; SUBCUTANEOUS at 04:07

## 2024-07-21 RX ADMIN — ASPIRIN 81 MG: 81 TABLET, COATED ORAL at 08:07

## 2024-07-21 RX ADMIN — SODIUM CHLORIDE: 9 INJECTION, SOLUTION INTRAVENOUS at 11:07

## 2024-07-21 RX ADMIN — INSULIN ASPART 2 UNITS: 100 INJECTION, SOLUTION INTRAVENOUS; SUBCUTANEOUS at 11:07

## 2024-07-21 RX ADMIN — DIPHENHYDRAMINE HYDROCHLORIDE 25 MG: 25 CAPSULE ORAL at 10:07

## 2024-07-21 RX ADMIN — ATORVASTATIN CALCIUM 40 MG: 40 TABLET, FILM COATED ORAL at 08:07

## 2024-07-21 RX ADMIN — MUPIROCIN: 20 OINTMENT TOPICAL at 08:07

## 2024-07-21 RX ADMIN — ENOXAPARIN SODIUM 40 MG: 40 INJECTION SUBCUTANEOUS at 04:07

## 2024-07-21 RX ADMIN — GABAPENTIN 300 MG: 300 CAPSULE ORAL at 08:07

## 2024-07-21 NOTE — ASSESSMENT & PLAN NOTE
Patient came in with nausea and vomiting anion gap has closed and patient is going to regular insulin with sliding scale

## 2024-07-21 NOTE — PLAN OF CARE
Problem: Adult Inpatient Plan of Care  Goal: Plan of Care Review  Outcome: Progressing  Goal: Patient-Specific Goal (Individualized)  Outcome: Progressing  Goal: Absence of Hospital-Acquired Illness or Injury  Outcome: Progressing  Goal: Optimal Comfort and Wellbeing  Outcome: Progressing  Goal: Readiness for Transition of Care  Outcome: Progressing     Problem: Diabetic Ketoacidosis  Goal: Optimal Coping  Outcome: Progressing  Goal: Fluid and Electrolyte Balance with Absence of Ketosis  Outcome: Progressing

## 2024-07-21 NOTE — HPI
Patient is a 23-year-old female with a history of ischemic stroke status post mechanical thrombectomy followed by decompressive craniotomy with resultant left upper extremity plegia and contracture along with essential hypertension and type 1 diabetes with a history of DKA who initially presented to [facility] ED complaining of intractable nausea vomiting followed by epigastric abdominal pain which started at around 2:00 p.m. yesterday afternoon.  Patient stated that she was vomiting partially digested food and denied vomiting any coffee-ground or blood.  Patient endorsed that after she stopped vomiting she started to experience epigastric pain which ultimately culminated in ED visit.     On initial presentation at [facility] ED, vital signs were stable and patient was afebrile.  Workup was notable for presence of hyperglycemia with blood glucose of 210, CO2 of 18, anion gap of 29, presence of ketone in urine, pH of 7.2, and lactic acid of 4.2.  Patient was given morphine for pain control and started on DKA protocol.      Patient was subsequently transferred to this facility for higher level of care

## 2024-07-21 NOTE — PROGRESS NOTES
[facility]  Critical Care Medicine  Progress Note    Patient Name: Teodoro Moran  MRN: [id number]  Admission Date: 7/21/2024  Hospital Length of Stay: 0 days  Code Status: No Order  Attending Provider: Masood Brown MD  Primary Care Provider: No, Primary Doctor   Principal Problem: DKA (diabetic ketoacidosis)    Subjective:     HPI:  23-year-old female past history of ischemic stroke status post mechanical thrombectomy and decompressive craniotomy with left upper extremity weakness and contracture told she was have 1 diabetes presented with intractable nausea and vomiting with abdominal pain this morning she is not having any nausea vomiting    Hospital/ICU Course:  07/21/2024 switched off of DKA protocol    Interval History/Significant Events:  Patient without complaints    Review of Systems  Objective:     Vital Signs (Most Recent):  Temp: 98.1 °F (36.7 °C) (07/21/24 0402)  Pulse: 95 (07/21/24 0645)  Resp: 14 (07/21/24 0645)  BP: 138/79 (07/21/24 0645)  SpO2: (!) 93 % (07/21/24 0645) Vital Signs (24h Range):  Temp:  [97.8 °F (36.6 °C)-98.1 °F (36.7 °C)] 98.1 °F (36.7 °C)  Pulse:  [] 95  Resp:  [10-31] 14  SpO2:  [93 %-100 %] 93 %  BP: (126-182)/() 138/79   Weight: 57.2 kg (126 lb 1.7 oz)  Body mass index is 21.65 kg/m².      Intake/Output Summary (Last 24 hours) at 7/21/2024 0712  Last data filed at 7/21/2024 0647  Gross per 24 hour   Intake 283.67 ml   Output 600 ml   Net -316.33 ml          Physical Exam  Vitals reviewed.   Constitutional:       Appearance: Normal appearance.      Interventions: She is not intubated.  HENT:      Head: Normocephalic and atraumatic.      Nose: Nose normal.      Mouth/Throat:      Mouth: Mucous membranes are dry.      Pharynx: Oropharynx is clear.   Eyes:      Extraocular Movements: Extraocular movements intact.      Conjunctiva/sclera: Conjunctivae normal.      Pupils: Pupils are equal, round, and reactive to light.   Cardiovascular:       Rate and Rhythm: Normal rate.      Heart sounds: Normal heart sounds. No murmur heard.  Pulmonary:      Effort: Pulmonary effort is normal. She is not intubated.      Breath sounds: Normal breath sounds.   Abdominal:      General: Abdomen is flat. Bowel sounds are normal.      Palpations: Abdomen is soft.   Musculoskeletal:         General: Normal range of motion.      Cervical back: Normal range of motion and neck supple.      Right lower leg: No edema.      Left lower leg: No edema.   Skin:     General: Skin is warm and dry.      Capillary Refill: Capillary refill takes less than 2 seconds.   Neurological:      General: No focal deficit present.      Mental Status: She is alert and oriented to person, place, and time.      Comments: Left upper extremity weakness   Psychiatric:         Mood and Affect: Mood normal.         Behavior: Behavior normal.            Vents:     Lines/Drains/Airways       Peripheral Intravenous Line  Duration                  Peripheral IV - Single Lumen 07/21/24 0406 18 G Anterior;Right Upper Arm <1 day         Peripheral IV - Single Lumen 07/21/24 0602 22 G Posterior;Right Hand <1 day                  Significant Labs:    CBC/Anemia Profile:  Recent Labs   Lab 07/20/24 2148   WBC 13.29*   HGB 13.0   HCT 41.3      MCV 71.6*   RDW 20.0*        Chemistries:  Recent Labs   Lab 07/20/24 2148 07/21/24  0031 07/21/24  0525    139 137   K 4.6 5.2* 4.8   CL 97* 100 111*   CO2 18* 17* 17*   BUN 15 15 13   CREATININE 0.95 0.89 0.71   CALCIUM 9.9 9.2 8.9   ALBUMIN 4.9  --   --    PROT 9.8*  --   --    BILITOT 0.5  --   --    ALKPHOS 134  --   --    ALT 58*  --   --    AST 43*  --   --    MG  --   --  1.9   PHOS  --   --  2.9       Recent Lab Results  (Last 5 results in the past 24 hours)        07/21/24  0546   07/21/24  0525   07/21/24  0142   07/21/24  0032   07/21/24  0031        Anion Gap   14       27       BUN   13       15       BUN/CREAT RATIO   18       17       Calcium    8.9       9.2       Chloride   111       100       CO2   17       17       Creatinine   0.71       0.89       eGFR   124       95       Glucose   163       208       Lactic Acid Level       2.4         Magnesium    1.9             Phosphorus Level   2.9             POC Base Excess               POC Glucose 170     194           POC HCO3               POC PCO2               POC PH               POC PO2               POC SATURATED O2               Potassium   4.8       5.2       Sodium   137       139                              Significant Imaging:  I have reviewed all pertinent imaging results/findings within the past 24 hours.    ABG  Recent Labs   Lab 07/21/24  0026   PH 7.21*   PO2 29   PCO2 40*   HCO3 16.0*     Assessment/Plan:     Neuro  Ischemic stroke  Noted    Cardiac/Vascular  Essential hypertension  Blood pressure is elevated make sure she was got home medicines    Renal/  Lactic acidosis  Resolving    Endocrine  * DKA (diabetic ketoacidosis)  Patient came in with nausea and vomiting anion gap has closed and patient is going to regular insulin with sliding scale    GI  Nausea and vomiting  Resolved          Masood Brown MD  Critical Care Medicine  [facility]

## 2024-07-21 NOTE — ASSESSMENT & PLAN NOTE
Patient initially presented to [facility] ED with lactic acid level of 4.2.  This is likely due to DKA.  With commencement of DKA protocol, lactic acid levels are trending down

## 2024-07-21 NOTE — ED NOTES
Verified with [facility] capacity command that pt demographic and video consent were rec'd.

## 2024-07-21 NOTE — ED NOTES
Notified [facility] dispatch of transfer to [facility]. Dispatcher states that she will send a crew right away.

## 2024-07-21 NOTE — ASSESSMENT & PLAN NOTE
Chronic, controlled. Latest blood pressure and vitals reviewed-     Temp:  [97.8 °F (36.6 °C)-98.1 °F (36.7 °C)]   Pulse:  []   Resp:  [14-18]   BP: (126-176)/()   SpO2:  [95 %-100 %] .   Home meds for hypertension were reviewed and noted below.       While in the hospital, will manage blood pressure as follows; Continue home antihypertensive regimen    Will utilize p.r.n. blood pressure medication only if patient's blood pressure greater than 180/110 and she develops symptoms such as worsening chest pain or shortness of breath.

## 2024-07-21 NOTE — ASSESSMENT & PLAN NOTE
Nausea vomiting followed by epigastric abdominal pain is likely due to DKA.  Patient is currently completely asymptomatic.  Will monitor

## 2024-07-21 NOTE — HPI
23-year-old female past history of ischemic stroke status post mechanical thrombectomy and decompressive craniotomy with left upper extremity weakness and contracture told she was have 1 diabetes presented with intractable nausea and vomiting with abdominal pain this morning she is not having any nausea vomiting

## 2024-07-21 NOTE — ED PROVIDER NOTES
Encounter Date: 7/20/2024       History     Chief Complaint   Patient presents with    Abdominal Pain    Vomiting     23 yr old AAF with PMH of DM, HLD, CVA and craniotomy (2yr ago -with left side weakness) to ED with c/o Nausea, vomiting and upper abdominal pain since 1400.  Alen has vomited 10 times today. Mother alen has had similar symptoms on the first day of her period 4 times since the stroke.    The history is provided by the patient.   Abdominal Pain  The current episode started several hours ago. The problem has not changed since onset.The abdominal pain is generalized. The abdominal pain does not radiate. The other symptoms of the illness include nausea and vomiting. The other symptoms of the illness do not include diarrhea.   The nausea is associated with her menstrual cycle.   The vomiting began today. Vomiting occurs 6 to 10 times per day. The emesis contains stomach contents.   Significant associated medical issues include diabetes.     Review of patient's allergies indicates:  No Known Allergies  Past Medical History:   Diagnosis Date    CVA (cerebral vascular accident) 07/27/2022    Diabetes mellitus     Left-sided weakness      Past Surgical History:   Procedure Laterality Date    CRANIOTOMY  07/2022     No family history on file.  Social History     Tobacco Use    Smoking status: Every Day     Current packs/day: 0.50     Types: Cigarettes    Smokeless tobacco: Never   Substance Use Topics    Alcohol use: Never    Drug use: Yes     Frequency: 14.0 times per week     Types: Marijuana     Review of Systems   Constitutional: Negative.    Respiratory: Negative.     Cardiovascular: Negative.    Gastrointestinal:  Positive for abdominal pain, nausea and vomiting. Negative for diarrhea.   Genitourinary: Negative.    Neurological:  Positive for weakness.        Left side weakness since Cva       Physical Exam     Initial Vitals [07/20/24 2138]   BP Pulse Resp Temp SpO2   (!) 169/102 93 16 97.8 °F  (36.6 °C) 95 %      MAP       --         Physical Exam    Nursing note and vitals reviewed.  Neck: Neck supple.   Cardiovascular:  Normal rate and regular rhythm.           Pulmonary/Chest: Breath sounds normal.   Abdominal: Abdomen is soft. There is abdominal tenderness in the right upper quadrant and epigastric area.   Musculoskeletal:      Cervical back: Neck supple.      Comments: Contracture to left upper extremity  Brace intact to left lower extremity.      Neurological: She is alert and oriented to person, place, and time.   Skin: Skin is warm and dry.   Psychiatric: She has a normal mood and affect.         Medical Screening Exam   See Full Note    ED Course   Procedures  Labs Reviewed   URINALYSIS - Abnormal       Result Value    Color, UA Light Red (*)     Clarity, UA Slightly Cloudy      pH, UA 5.5      Leukocytes, UA Negative      Nitrites, UA Negative      Protein,  (*)     Glucose,  (*)     Ketones, UA >=160 (*)     Urobilinogen, UA 0.2      Bilirubin, UA Negative      Blood, UA Large (*)     Specific Gravity, UA 1.020     COMPREHENSIVE METABOLIC PANEL - Abnormal    Sodium 139      Potassium 4.6      Chloride 97 (*)     CO2 18 (*)     Anion Gap 29 (*)     Glucose 210 (*)     BUN 15      Creatinine 0.95      BUN/Creatinine Ratio 16      Calcium 9.9      Total Protein 9.8 (*)     Albumin 4.9      Globulin 4.9 (*)     A/G Ratio 1.0      Bilirubin, Total 0.5      Alk Phos 134      ALT 58 (*)     AST 43 (*)     eGFR 88     LIPASE - Abnormal    Lipase 9 (*)    CBC WITH DIFFERENTIAL - Abnormal    WBC 13.29 (*)     RBC 5.77 (*)     Hemoglobin 13.0      Hematocrit 41.3      MCV 71.6 (*)     MCH 22.5 (*)     MCHC 31.5 (*)     RDW 20.0 (*)     Platelet Count 266      MPV 9.9      Neutrophils % 90.4 (*)     Lymphocytes % 7.8 (*)     Neutrophils, Abs 12.03 (*)     Lymphocytes, Absolute 1.03      Diff Type Auto      Monocytes % 1.6 (*)     Eosinophils % 0.0 (*)     Basophils % 0.2      Monocytes,  Absolute 0.21      Eosinophils, Absolute 0.00      Basophils, Absolute 0.02     LACTIC ACID, PLASMA - Abnormal    Lactic Acid 4.2 (*)    LACTIC ACID, PLASMA - Abnormal    Lactic Acid 2.9 (*)    URINALYSIS, MICROSCOPIC - Abnormal    WBC, UA 0-5      RBC, UA 10-15 (*)     Bacteria, UA Moderate (*)     Squamous Epithelial Cells, UA Few (*)    BASIC METABOLIC PANEL - Abnormal    Sodium 139      Potassium 5.2 (*)     Chloride 100      CO2 17 (*)     Anion Gap 27 (*)     Glucose 208 (*)     BUN 15      Creatinine 0.89      BUN/Creatinine Ratio 17      Calcium 9.2      eGFR 95     LACTIC ACID, PLASMA - Abnormal    Lactic Acid 2.4 (*)    POCT GLUCOSE MONITORING CONTINUOUS - Abnormal    POC Glucose 189 (*)    POCT GLUCOSE MONITORING CONTINUOUS - Abnormal    POC Glucose 194 (*)    HCG QUALITATIVE URINE - Normal    HCG Qualitative, Urine Negative     CULTURE, URINE   CBC W/ AUTO DIFFERENTIAL    Narrative:     The following orders were created for panel order CBC W/ AUTO DIFFERENTIAL.  Procedure                               Abnormality         Status                     ---------                               -----------         ------                     CBC with Differential[[id number]]       Abnormal            Final result                 Please view results for these tests on the individual orders.   POCT GLUCOSE MONITORING CONTINUOUS   POCT GLUCOSE MONITORING CONTINUOUS          Imaging Results              X-Ray Chest AP Portable (In process)                      Medications   sodium chloride 0.9% flush 10 mL (has no administration in time range)   dextrose 5 % and 0.45 % NaCl infusion ( Intravenous New Bag 7/21/24 0109)   dextrose 50% injection 25 g (has no administration in time range)   dextrose 10 % infusion (has no administration in time range)   dextrose 50% injection 12.5 g (has no administration in time range)   dextrose 10 % infusion (has no administration in time range)   insulin regular in 0.9 % NaCl 100  unit/100 mL (1 unit/mL) infusion (0.05 Units/kg/hr × 56.7 kg Intravenous New Bag 7/21/24 0109)   ondansetron injection 4 mg (4 mg Intravenous Given 7/20/24 2155)   sodium chloride 0.9% bolus 500 mL 500 mL (0 mLs Intravenous Stopped 7/20/24 2344)   morphine injection 2 mg (2 mg Intravenous Given 7/20/24 2226)   sodium chloride 0.9% bolus 500 mL 500 mL (0 mLs Intravenous Stopped 7/20/24 2300)   promethazine (PHENERGAN) 12.5 mg in 0.9% NaCl 50 mL IVPB (0 mg Intravenous Stopped 7/20/24 2354)   sodium chloride 0.9% bolus 500 mL 500 mL (0 mLs Intravenous Stopped 7/21/24 0026)   morphine injection 2 mg (2 mg Intravenous Given 7/20/24 2354)     Medical Decision Making  23 yr old AAF with PMH of DM, HLD, CVA and craniotomy (2yr ago -with left side weakness) to ED with c/o Nausea, vomiting and upper abdominal pain since 1400.  Reports has vomited 10 times today. Mother reports has had similar symptoms on the first day of her period 4 times since the stroke.    Pt is followed by endocrinology and neuro at [facility].  [facility] is at capacity, will transfer to [facility] for ICU    Amount and/or Complexity of Data Reviewed  Labs: ordered. Decision-making details documented in ED Course.  Radiology: ordered.     Details: unremarkable  ECG/medicine tests: ordered.     Details: Sinus tachycardia, no STEMI, No acute ectopy  Discussion of management or test interpretation with external provider(s): Dr Kumar accepts for transfer to [facility] ICU 2    Risk  Prescription drug management.               ED Course as of 07/21/24 0316   Sat Jul 20, 2024 2227 Lactic Acid Level(!): 4.2  Continue IV NS bolus [CG]   2227 WBC(!): 13.29  Pt has vomited x 10 times since 1400.   [CG]   2228 Reports pain and nausea have improved with medications [CG]   2320 Will consult telemed [CG]   2332 C/o nausea again, pt having dry heaves, will tx with phenergan [CG]   2341 Glucose, UA(!): 500 [CG]   2342 Blood, UA(!): Large  Currently on menstrual cycle [CG]   2350 Glucose down  189, lactic down 2.9.  Will give another 500 ml IV NS and recheck.   [CG]   2352 C/o abdominal pain, will repeat Morphine.   [CG]   Sun Jul 21, 2024   0102 POC Glucose(!): 189  Will start insulin infusion but at 0.5units.  Will also change NS to D5 1/2 NS.   [CG]   0103 Venous pH 7.21  [CG]   0110 [facility] transfer center reports patient accepted to [facility] by Dr. Kumar, awaiting bed assignment.   [CG]   0125 Reports pain and nausea have improved.  Pt request water. Instructed nothing to eat or drink at this time.   [CG]   0132 [facility] transfer center reports pt assigned to ICU 2 at [facility] [CG]   0151 Pt denies complaints at this time.  Awaiting EMS transport [CG]   0241 Pt transferred to [facility] [CG]      ED Course User Index  [CG] Aminta Rene FNP                           Clinical Impression:   Final diagnoses:  [R10.9] Abdominal pain, unspecified abdominal location (Primary)  [R11.10] Vomiting, unspecified vomiting type, unspecified whether nausea present  [E10.10] Type 1 diabetes mellitus with ketoacidosis without coma - pH 7.21  [R73.9] Hyperglycemia  [E86.0] Dehydration        ED Disposition Condition    Transfer to Another Facility Stable                Aminta Rene FNP  07/21/24 2098

## 2024-07-21 NOTE — HOSPITAL COURSE
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 yesterday afternoon.  Patient stated that she was vomiting partially digested food and denied vomiting any coffee-ground or blood.  Patient endorsed that after she stopped vomiting she started to experience epigastric pain which ultimately culminated in ED visit.      On initial presentation at [facility] ED, vital signs were stable and patient was afebrile.  Workup was notable for presence of hyperglycemia with blood glucose of 210, CO2 of 18, anion gap of 29, presence of ketone in urine, pH of 7.2, and lactic acid of 4.2.  Patient was given morphine for pain control and started on DKA protocol.       Patient was subsequently transferred to this facility for higher level of care     07/21/2024 switched off of DKA protocol  7/22- hypoglycemic this morning 39     7/23 -- pt admitted for DKA. This is felt to be secondary to her vomiting with her menstrual cycle.  She did have an episode of hypoglycemia after restarting her long acting insulin. This was likely from her poor compliance with ADA diet at home verses her being on one in the hospital. This was discussed in detail with her and her mother who cares for her. Recommenced that she follow up with her endocrinologist and see diabetic educator outpatient.  She will be discharged home today.

## 2024-07-21 NOTE — ASSESSMENT & PLAN NOTE
Patient initially presented to [facility] ED with blood glucose of 210, CO2 of 18, anion gap of 29, presence of ketone in urine, and pH of 7.2.  Diagnosis of DKA was made, and patient was started on DKA protocol.  Will continue patient on DKA protocol.  Precipitating factors for development of DKA are unknown at this time

## 2024-07-21 NOTE — ED NOTES
Pt left via [facility] at this time. VSS. Pt A&O. Insulin and D5 1/2 NS on dial-a-flow per EMS. Writing RN instructed to send medication on pump and paramedic refused and placed both medications on same dial-a-flow.  at time of discharge.

## 2024-07-21 NOTE — ASSESSMENT & PLAN NOTE
Patient has a history of ischemic stroke status post mechanical thrombectomy followed by decompressive craniotomy with resultant left upper extremity plegia and contracture in 2022.  Patient is neurologically at her baseline.  Continue present management with aspirin and statin

## 2024-07-21 NOTE — ED NOTES
Pt demographic sheet and video consent faxed to [facility] for Telemed consult.

## 2024-07-21 NOTE — ED TRIAGE NOTES
Pt presents with c/o n/v and abd pain that started around 1400. Pt mother states when pt starts period she has been having extreme abd cramps and vomiting for past few cycles at start date. Pt has extensive medical history. Took zofran but vomited shortly after.

## 2024-07-21 NOTE — SUBJECTIVE & OBJECTIVE
Interval History/Significant Events:  Patient without complaints    Review of Systems  Objective:     Vital Signs (Most Recent):  Temp: 98.1 °F (36.7 °C) (07/21/24 0402)  Pulse: 95 (07/21/24 0645)  Resp: 14 (07/21/24 0645)  BP: 138/79 (07/21/24 0645)  SpO2: (!) 93 % (07/21/24 0645) Vital Signs (24h Range):  Temp:  [97.8 °F (36.6 °C)-98.1 °F (36.7 °C)] 98.1 °F (36.7 °C)  Pulse:  [] 95  Resp:  [10-31] 14  SpO2:  [93 %-100 %] 93 %  BP: (126-182)/() 138/79   Weight: 57.2 kg (126 lb 1.7 oz)  Body mass index is 21.65 kg/m².      Intake/Output Summary (Last 24 hours) at 7/21/2024 0712  Last data filed at 7/21/2024 0647  Gross per 24 hour   Intake 283.67 ml   Output 600 ml   Net -316.33 ml          Physical Exam  Vitals reviewed.   Constitutional:       Appearance: Normal appearance.      Interventions: She is not intubated.  HENT:      Head: Normocephalic and atraumatic.      Nose: Nose normal.      Mouth/Throat:      Mouth: Mucous membranes are dry.      Pharynx: Oropharynx is clear.   Eyes:      Extraocular Movements: Extraocular movements intact.      Conjunctiva/sclera: Conjunctivae normal.      Pupils: Pupils are equal, round, and reactive to light.   Cardiovascular:      Rate and Rhythm: Normal rate.      Heart sounds: Normal heart sounds. No murmur heard.  Pulmonary:      Effort: Pulmonary effort is normal. She is not intubated.      Breath sounds: Normal breath sounds.   Abdominal:      General: Abdomen is flat. Bowel sounds are normal.      Palpations: Abdomen is soft.   Musculoskeletal:         General: Normal range of motion.      Cervical back: Normal range of motion and neck supple.      Right lower leg: No edema.      Left lower leg: No edema.   Skin:     General: Skin is warm and dry.      Capillary Refill: Capillary refill takes less than 2 seconds.   Neurological:      General: No focal deficit present.      Mental Status: She is alert and oriented to person, place, and time.      Comments:  Left upper extremity weakness   Psychiatric:         Mood and Affect: Mood normal.         Behavior: Behavior normal.            Vents:     Lines/Drains/Airways       Peripheral Intravenous Line  Duration                  Peripheral IV - Single Lumen 07/21/24 0406 18 G Anterior;Right Upper Arm <1 day         Peripheral IV - Single Lumen 07/21/24 0602 22 G Posterior;Right Hand <1 day                  Significant Labs:    CBC/Anemia Profile:  Recent Labs   Lab 07/20/24 2148   WBC 13.29*   HGB 13.0   HCT 41.3      MCV 71.6*   RDW 20.0*        Chemistries:  Recent Labs   Lab 07/20/24 2148 07/21/24  0031 07/21/24  0525    139 137   K 4.6 5.2* 4.8   CL 97* 100 111*   CO2 18* 17* 17*   BUN 15 15 13   CREATININE 0.95 0.89 0.71   CALCIUM 9.9 9.2 8.9   ALBUMIN 4.9  --   --    PROT 9.8*  --   --    BILITOT 0.5  --   --    ALKPHOS 134  --   --    ALT 58*  --   --    AST 43*  --   --    MG  --   --  1.9   PHOS  --   --  2.9       Recent Lab Results  (Last 5 results in the past 24 hours)        07/21/24  0546   07/21/24  0525   07/21/24  0142   07/21/24  0032   07/21/24  0031        Anion Gap   14       27       BUN   13       15       BUN/CREAT RATIO   18       17       Calcium   8.9       9.2       Chloride   111       100       CO2   17       17       Creatinine   0.71       0.89       eGFR   124       95       Glucose   163       208       Lactic Acid Level       2.4         Magnesium    1.9             Phosphorus Level   2.9             POC Base Excess               POC Glucose 170     194           POC HCO3               POC PCO2               POC PH               POC PO2               POC SATURATED O2               Potassium   4.8       5.2       Sodium   137       139                              Significant Imaging:  I have reviewed all pertinent imaging results/findings within the past 24 hours.

## 2024-07-21 NOTE — SUBJECTIVE & OBJECTIVE
Past Medical History:   Diagnosis Date    CVA (cerebral vascular accident) 07/27/2022    Diabetes mellitus     Left-sided weakness        Past Surgical History:   Procedure Laterality Date    CRANIOTOMY  07/2022       Review of patient's allergies indicates:  No Known Allergies    Current Facility-Administered Medications on File Prior to Encounter   Medication    [COMPLETED] morphine injection 2 mg    [COMPLETED] morphine injection 2 mg    [COMPLETED] ondansetron injection 4 mg    [COMPLETED] promethazine (PHENERGAN) 12.5 mg in 0.9% NaCl 50 mL IVPB    [COMPLETED] sodium chloride 0.9% bolus 500 mL 500 mL    [COMPLETED] sodium chloride 0.9% bolus 500 mL 500 mL    [COMPLETED] sodium chloride 0.9% bolus 500 mL 500 mL    [DISCONTINUED] dextrose 10 % infusion    [DISCONTINUED] dextrose 10 % infusion    [DISCONTINUED] dextrose 5 % and 0.45 % NaCl infusion    [DISCONTINUED] dextrose 50% injection 12.5 g    [DISCONTINUED] dextrose 50% injection 25 g    [DISCONTINUED] insulin regular in 0.9 % NaCl 100 unit/100 mL (1 unit/mL) infusion    [DISCONTINUED] sodium chloride 0.9% flush 10 mL     Current Outpatient Medications on File Prior to Encounter   Medication Sig    amitriptyline (ELAVIL) 10 MG tablet Take 10 mg by mouth every evening.    aspirin (ECOTRIN) 81 MG EC tablet Take 1 tablet by mouth once daily.    atorvastatin (LIPITOR) 40 MG tablet Take 40 mg by mouth every evening.    baclofen (LIORESAL) 10 MG tablet Take 10 mg by mouth every evening.    dapagliflozin propanediol (FARXIGA ORAL) Take by mouth.    gabapentin (NEURONTIN) 300 MG capsule Take 300 mg by mouth every evening.    insulin aspart U-100 (NOVOLOG) 100 unit/mL (3 mL) InPn pen INJECT 14 UNITS WITH BREAKFAST AND LUNCH, AND 12 UNITS WITH DINNER. PLUS SLIDING SCALE: LESS THAN 200, NO EXTRA INSULIN; 201-250, 2 UNITS: 251-300, 4 UNITS: 301-350, 6 UNITS: 351-400, 8 UNITS: OVER 400, 10 UNITS. MAX DAILY DOSE 70 UNITS    LANTUS SOLOSTAR U-100 INSULIN glargine 100  units/mL SubQ pen Inject into the skin.    ondansetron (ZOFRAN-ODT) 4 MG TbDL Take 1 tablet (4 mg total) by mouth every 6 (six) hours as needed (nausea).    [DISCONTINUED] EScitalopram oxalate (LEXAPRO) 10 MG tablet Take 10 mg by mouth.     Family History    None       Tobacco Use    Smoking status: Every Day     Current packs/day: 0.50     Types: Cigarettes    Smokeless tobacco: Never   Substance and Sexual Activity    Alcohol use: Never    Drug use: Yes     Frequency: 14.0 times per week     Types: Marijuana    Sexual activity: Yes     Review of Systems   Constitutional:  Negative for chills and fever.   HENT:  Negative for postnasal drip and rhinorrhea.    Eyes:  Negative for itching.   Respiratory:  Negative for shortness of breath.    Cardiovascular:  Negative for chest pain, palpitations and leg swelling.   Gastrointestinal:  Positive for abdominal pain, nausea and vomiting. Negative for abdominal distention and diarrhea.   Endocrine: Negative for cold intolerance, heat intolerance, polydipsia, polyphagia and polyuria.   Genitourinary:  Negative for dysuria and hematuria.   Musculoskeletal:  Negative for arthralgias, joint swelling, myalgias, neck pain and neck stiffness.   Skin:  Negative for pallor and rash.   Allergic/Immunologic: Negative for environmental allergies, food allergies and immunocompromised state.   Neurological:  Negative for dizziness, seizures, facial asymmetry, light-headedness and numbness.     Objective:     Vital Signs (Most Recent):  Temp: 98.1 °F (36.7 °C) (07/21/24 0402)  Pulse: 89 (07/21/24 0415)  Resp: 14 (07/21/24 0415)  BP: (!) 176/80 (07/21/24 0415)  SpO2: 100 % (07/21/24 0415) Vital Signs (24h Range):  Temp:  [97.8 °F (36.6 °C)-98.1 °F (36.7 °C)] 98.1 °F (36.7 °C)  Pulse:  [] 89  Resp:  [14-18] 14  SpO2:  [95 %-100 %] 100 %  BP: (126-176)/() 176/80     Weight: 57.2 kg (126 lb 1.7 oz)  Body mass index is 21.65 kg/m².     Physical Exam  Vitals reviewed.    Constitutional:       General: She is not in acute distress.     Appearance: Normal appearance.   HENT:      Head: Normocephalic and atraumatic.      Right Ear: External ear normal.      Left Ear: External ear normal.      Mouth/Throat:      Mouth: Mucous membranes are dry.   Eyes:      Extraocular Movements: Extraocular movements intact.      Pupils: Pupils are equal, round, and reactive to light.   Cardiovascular:      Rate and Rhythm: Normal rate and regular rhythm.      Pulses: Normal pulses.      Heart sounds: Normal heart sounds. No murmur heard.  Pulmonary:      Effort: Pulmonary effort is normal. No respiratory distress.      Breath sounds: Normal breath sounds. No wheezing.   Chest:      Chest wall: No tenderness.   Abdominal:      General: Abdomen is flat.      Palpations: Abdomen is soft. There is no mass.      Tenderness: There is no abdominal tenderness. There is no right CVA tenderness or left CVA tenderness.   Musculoskeletal:         General: No swelling or tenderness. Normal range of motion.   Skin:     Capillary Refill: Capillary refill takes less than 2 seconds.      Comments: Skin turgor was poor   Neurological:      Mental Status: She is alert and oriented to person, place, and time. Mental status is at baseline.      Comments: Left upper extremity plegia and contracture was noted   Psychiatric:         Mood and Affect: Mood normal.         Thought Content: Thought content normal.              CRANIAL NERVES     CN III, IV, VI   Pupils are equal, round, and reactive to light.       Significant Labs: All pertinent labs within the past 24 hours have been reviewed.    Significant Imaging: I have reviewed all pertinent imaging results/findings within the past 24 hours.

## 2024-07-21 NOTE — PLAN OF CARE
Problem: Diabetes Comorbidity  Goal: Blood Glucose Level Within Targeted Range  Intervention: Monitor and Manage Glycemia  Flowsheets (Taken 7/21/2024 0619)  Glycemic Management:   blood glucose monitored   insulin infusion initiated

## 2024-07-21 NOTE — H&P
[facility] Hospital Medicine  History & Physical    Patient Name: Teodoro Moran  MRN: [id number]  Patient Class: IP- Inpatient  Admission Date: 7/21/2024  Attending Physician:  PEGGY Brown MD  Primary Care Provider: Carissa Primary Doctor         Patient information was obtained from patient and ER records.     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 yesterday afternoon.  Patient stated that she was vomiting partially digested food and denied vomiting any coffee-ground or blood.  Patient endorsed that after she stopped vomiting she started to experience epigastric pain which ultimately culminated in ED visit.     On initial presentation at [facility] ED, vital signs were stable and patient was afebrile.  Workup was notable for presence of hyperglycemia with blood glucose of 210, CO2 of 18, anion gap of 29, presence of ketone in urine, pH of 7.2, and lactic acid of 4.2.  Patient was given morphine for pain control and started on DKA protocol.      Patient was subsequently transferred to this facility for higher level of care    Past Medical History:   Diagnosis Date    CVA (cerebral vascular accident) 07/27/2022    Diabetes mellitus     Left-sided weakness        Past Surgical History:   Procedure Laterality Date    CRANIOTOMY  07/2022       Review of patient's allergies indicates:  No Known Allergies    Current Facility-Administered Medications on File Prior to Encounter   Medication     [COMPLETED] morphine injection 2 mg    [COMPLETED] morphine injection 2 mg    [COMPLETED] ondansetron injection 4 mg    [COMPLETED] promethazine (PHENERGAN) 12.5 mg in 0.9% NaCl 50 mL IVPB    [COMPLETED] sodium chloride 0.9% bolus 500 mL 500 mL    [COMPLETED] sodium chloride 0.9% bolus 500 mL 500 mL    [COMPLETED] sodium chloride 0.9% bolus 500 mL 500 mL    [DISCONTINUED] dextrose 10 % infusion    [DISCONTINUED] dextrose 10 % infusion    [DISCONTINUED] dextrose 5 % and 0.45 % NaCl infusion    [DISCONTINUED] dextrose 50% injection 12.5 g    [DISCONTINUED] dextrose 50% injection 25 g    [DISCONTINUED] insulin regular in 0.9 % NaCl 100 unit/100 mL (1 unit/mL) infusion    [DISCONTINUED] sodium chloride 0.9% flush 10 mL     Current Outpatient Medications on File Prior to Encounter   Medication Sig    amitriptyline (ELAVIL) 10 MG tablet Take 10 mg by mouth every evening.    aspirin (ECOTRIN) 81 MG EC tablet Take 1 tablet by mouth once daily.    atorvastatin (LIPITOR) 40 MG tablet Take 40 mg by mouth every evening.    baclofen (LIORESAL) 10 MG tablet Take 10 mg by mouth every evening.    dapagliflozin propanediol (FARXIGA ORAL) Take by mouth.    gabapentin (NEURONTIN) 300 MG capsule Take 300 mg by mouth every evening.    insulin aspart U-100 (NOVOLOG) 100 unit/mL (3 mL) InPn pen INJECT 14 UNITS WITH BREAKFAST AND LUNCH, AND 12 UNITS WITH DINNER. PLUS SLIDING SCALE: LESS THAN 200, NO EXTRA INSULIN; 201-250, 2 UNITS: 251-300, 4 UNITS: 301-350, 6 UNITS: 351-400, 8 UNITS: OVER 400, 10 UNITS. MAX DAILY DOSE 70 UNITS    LANTUS SOLOSTAR U-100 INSULIN glargine 100 units/mL SubQ pen Inject into the skin.    ondansetron (ZOFRAN-ODT) 4 MG TbDL Take 1 tablet (4 mg total) by mouth every 6 (six) hours as needed (nausea).    [DISCONTINUED] EScitalopram oxalate (LEXAPRO) 10 MG tablet Take 10 mg by mouth.     Family History    None       Tobacco Use    Smoking status: Every Day     Current packs/day: 0.50     Types: Cigarettes    Smokeless  tobacco: Never   Substance and Sexual Activity    Alcohol use: Never    Drug use: Yes     Frequency: 14.0 times per week     Types: Marijuana    Sexual activity: Yes     Review of Systems   Constitutional:  Negative for chills and fever.   HENT:  Negative for postnasal drip and rhinorrhea.    Eyes:  Negative for itching.   Respiratory:  Negative for shortness of breath.    Cardiovascular:  Negative for chest pain, palpitations and leg swelling.   Gastrointestinal:  Positive for abdominal pain, nausea and vomiting. Negative for abdominal distention and diarrhea.   Endocrine: Negative for cold intolerance, heat intolerance, polydipsia, polyphagia and polyuria.   Genitourinary:  Negative for dysuria and hematuria.   Musculoskeletal:  Negative for arthralgias, joint swelling, myalgias, neck pain and neck stiffness.   Skin:  Negative for pallor and rash.   Allergic/Immunologic: Negative for environmental allergies, food allergies and immunocompromised state.   Neurological:  Negative for dizziness, seizures, facial asymmetry, light-headedness and numbness.     Objective:     Vital Signs (Most Recent):  Temp: 98.1 °F (36.7 °C) (07/21/24 0402)  Pulse: 89 (07/21/24 0415)  Resp: 14 (07/21/24 0415)  BP: (!) 176/80 (07/21/24 0415)  SpO2: 100 % (07/21/24 0415) Vital Signs (24h Range):  Temp:  [97.8 °F (36.6 °C)-98.1 °F (36.7 °C)] 98.1 °F (36.7 °C)  Pulse:  [] 89  Resp:  [14-18] 14  SpO2:  [95 %-100 %] 100 %  BP: (126-176)/() 176/80     Weight: 57.2 kg (126 lb 1.7 oz)  Body mass index is 21.65 kg/m².     Physical Exam  Vitals reviewed.   Constitutional:       General: She is not in acute distress.     Appearance: Normal appearance.   HENT:      Head: Normocephalic and atraumatic.      Right Ear: External ear normal.      Left Ear: External ear normal.      Mouth/Throat:      Mouth: Mucous membranes are dry.   Eyes:      Extraocular Movements: Extraocular movements intact.      Pupils: Pupils are equal, round, and  reactive to light.   Cardiovascular:      Rate and Rhythm: Normal rate and regular rhythm.      Pulses: Normal pulses.      Heart sounds: Normal heart sounds. No murmur heard.  Pulmonary:      Effort: Pulmonary effort is normal. No respiratory distress.      Breath sounds: Normal breath sounds. No wheezing.   Chest:      Chest wall: No tenderness.   Abdominal:      General: Abdomen is flat.      Palpations: Abdomen is soft. There is no mass.      Tenderness: There is no abdominal tenderness. There is no right CVA tenderness or left CVA tenderness.   Musculoskeletal:         General: No swelling or tenderness. Normal range of motion.   Skin:     Capillary Refill: Capillary refill takes less than 2 seconds.      Comments: Skin turgor was poor   Neurological:      Mental Status: She is alert and oriented to person, place, and time. Mental status is at baseline.      Comments: Left upper extremity plegia and contracture was noted   Psychiatric:         Mood and Affect: Mood normal.         Thought Content: Thought content normal.              CRANIAL NERVES     CN III, IV, VI   Pupils are equal, round, and reactive to light.       Significant Labs: All pertinent labs within the past 24 hours have been reviewed.    Significant Imaging: I have reviewed all pertinent imaging results/findings within the past 24 hours.  Assessment/Plan:     * DKA (diabetic ketoacidosis)    Patient initially presented to [facility] ED with blood glucose of 210, CO2 of 18, anion gap of 29, presence of ketone in urine, and pH of 7.2.  Diagnosis of DKA was made, and patient was started on DKA protocol.  Will continue patient on DKA protocol.  Precipitating factors for development of DKA are unknown at this time      Nausea and vomiting    Nausea vomiting followed by epigastric abdominal pain is likely due to DKA.  Patient is currently completely asymptomatic.  Will monitor      Lactic acidosis    Patient initially presented to [facility] ED with  lactic acid level of 4.2.  This is likely due to DKA.  With commencement of DKA protocol, lactic acid levels are trending down      Essential hypertension    Chronic, controlled. Latest blood pressure and vitals reviewed-     Temp:  [97.8 °F (36.6 °C)-98.1 °F (36.7 °C)]   Pulse:  []   Resp:  [14-18]   BP: (126-176)/()   SpO2:  [95 %-100 %] .   Home meds for hypertension were reviewed and noted below.       While in the hospital, will manage blood pressure as follows; Continue home antihypertensive regimen    Will utilize p.r.n. blood pressure medication only if patient's blood pressure greater than 180/110 and she develops symptoms such as worsening chest pain or shortness of breath.    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  Patient is neurologically at her baseline.  Continue present management with aspirin and statin      VTE Risk Mitigation (From admission, onward)      Lovenox 40 mg subQ Q 24 hours                            Marcos Kumar MD  Department of Hospital Medicine  [facility]

## 2024-07-21 NOTE — PLAN OF CARE
Problem: Adult Inpatient Plan of Care  Goal: Plan of Care Review  Outcome: Progressing  Goal: Patient-Specific Goal (Individualized)  Outcome: Progressing  Goal: Absence of Hospital-Acquired Illness or Injury  Outcome: Progressing  Goal: Optimal Comfort and Wellbeing  Outcome: Progressing  Goal: Readiness for Transition of Care  Outcome: Progressing     Problem: Diabetic Ketoacidosis  Goal: Optimal Coping  Outcome: Progressing  Goal: Fluid and Electrolyte Balance with Absence of Ketosis  Outcome: Progressing     Problem: Diabetes Comorbidity  Goal: Blood Glucose Level Within Targeted Range  Outcome: Progressing     Problem: Skin Injury Risk Increased  Goal: Skin Health and Integrity  Outcome: Progressing

## 2024-07-22 PROBLEM — E10.9 TYPE 1 DIABETES MELLITUS: Status: ACTIVE | Noted: 2024-07-22

## 2024-07-22 LAB
ANION GAP SERPL CALCULATED.3IONS-SCNC: 10 MMOL/L (ref 7–16)
ANISOCYTOSIS BLD QL SMEAR: NORMAL
BASOPHILS # BLD AUTO: 0.01 K/UL (ref 0–0.2)
BASOPHILS NFR BLD AUTO: 0.1 % (ref 0–1)
BUN SERPL-MCNC: 7 MG/DL (ref 7–18)
BUN/CREAT SERPL: 11 (ref 6–20)
CALCIUM SERPL-MCNC: 7.7 MG/DL (ref 8.5–10.1)
CHLORIDE SERPL-SCNC: 112 MMOL/L (ref 98–107)
CO2 SERPL-SCNC: 22 MMOL/L (ref 21–32)
CREAT SERPL-MCNC: 0.61 MG/DL (ref 0.55–1.02)
DIFFERENTIAL METHOD BLD: ABNORMAL
EGFR (NO RACE VARIABLE) (RUSH/TITUS): 131 ML/MIN/1.73M2
EOSINOPHIL # BLD AUTO: 0.05 K/UL (ref 0–0.5)
EOSINOPHIL NFR BLD AUTO: 0.6 % (ref 1–4)
ERYTHROCYTE [DISTWIDTH] IN BLOOD BY AUTOMATED COUNT: 19 % (ref 11.5–14.5)
FERRITIN SERPL-MCNC: 20 NG/ML (ref 8–252)
GLUCOSE SERPL-MCNC: 152 MG/DL (ref 74–106)
GLUCOSE SERPL-MCNC: 181 MG/DL (ref 70–105)
GLUCOSE SERPL-MCNC: 91 MG/DL (ref 70–105)
HCT VFR BLD AUTO: 27.2 % (ref 38–47)
HGB BLD-MCNC: 8.6 G/DL (ref 12–16)
HYPOCHROMIA BLD QL SMEAR: NORMAL
IMM GRANULOCYTES # BLD AUTO: 0.02 K/UL (ref 0–0.04)
IMM GRANULOCYTES NFR BLD: 0.3 % (ref 0–0.4)
IRON SATN MFR SERPL: 13 % (ref 14–50)
IRON SERPL-MCNC: 34 ΜG/DL (ref 50–170)
LYMPHOCYTES # BLD AUTO: 2.35 K/UL (ref 1–4.8)
LYMPHOCYTES NFR BLD AUTO: 30 % (ref 27–41)
MCH RBC QN AUTO: 22.6 PG (ref 27–31)
MCHC RBC AUTO-ENTMCNC: 31.6 G/DL (ref 32–36)
MCV RBC AUTO: 71.4 FL (ref 80–96)
MICROCYTES BLD QL SMEAR: NORMAL
MONOCYTES # BLD AUTO: 0.33 K/UL (ref 0–0.8)
MONOCYTES NFR BLD AUTO: 4.2 % (ref 2–6)
MPC BLD CALC-MCNC: 9.5 FL (ref 9.4–12.4)
NEUTROPHILS # BLD AUTO: 5.08 K/UL (ref 1.8–7.7)
NEUTROPHILS NFR BLD AUTO: 64.8 % (ref 53–65)
NRBC # BLD AUTO: 0 X10E3/UL
NRBC, AUTO (.00): 0 %
OHS QRS DURATION: 80 MS
OHS QTC CALCULATION: 479 MS
PLATELET # BLD AUTO: 193 K/UL (ref 150–400)
PLATELET MORPHOLOGY: NORMAL
POTASSIUM SERPL-SCNC: 3.3 MMOL/L (ref 3.5–5.1)
RBC # BLD AUTO: 3.81 M/UL (ref 4.2–5.4)
SODIUM SERPL-SCNC: 141 MMOL/L (ref 136–145)
TARGETS BLD QL SMEAR: NORMAL
TIBC SERPL-MCNC: 258 ΜG/DL (ref 250–450)
TRANSFERRIN SERPL-MCNC: 202 MG/DL (ref 200–360)
WBC # BLD AUTO: 7.84 K/UL (ref 4.5–11)

## 2024-07-22 PROCEDURE — 25000003 PHARM REV CODE 250: Performed by: STUDENT IN AN ORGANIZED HEALTH CARE EDUCATION/TRAINING PROGRAM

## 2024-07-22 PROCEDURE — 83020 HEMOGLOBIN ELECTROPHORESIS: CPT | Performed by: STUDENT IN AN ORGANIZED HEALTH CARE EDUCATION/TRAINING PROGRAM

## 2024-07-22 PROCEDURE — 11000001 HC ACUTE MED/SURG PRIVATE ROOM

## 2024-07-22 PROCEDURE — 36415 COLL VENOUS BLD VENIPUNCTURE: CPT | Performed by: NURSE PRACTITIONER

## 2024-07-22 PROCEDURE — 82962 GLUCOSE BLOOD TEST: CPT

## 2024-07-22 PROCEDURE — 82728 ASSAY OF FERRITIN: CPT | Performed by: STUDENT IN AN ORGANIZED HEALTH CARE EDUCATION/TRAINING PROGRAM

## 2024-07-22 PROCEDURE — 63600175 PHARM REV CODE 636 W HCPCS: Performed by: NURSE PRACTITIONER

## 2024-07-22 PROCEDURE — 63600175 PHARM REV CODE 636 W HCPCS: Performed by: INTERNAL MEDICINE

## 2024-07-22 PROCEDURE — 83540 ASSAY OF IRON: CPT | Performed by: STUDENT IN AN ORGANIZED HEALTH CARE EDUCATION/TRAINING PROGRAM

## 2024-07-22 PROCEDURE — 85025 COMPLETE CBC W/AUTO DIFF WBC: CPT | Performed by: NURSE PRACTITIONER

## 2024-07-22 PROCEDURE — 94761 N-INVAS EAR/PLS OXIMETRY MLT: CPT

## 2024-07-22 PROCEDURE — 63600175 PHARM REV CODE 636 W HCPCS: Performed by: STUDENT IN AN ORGANIZED HEALTH CARE EDUCATION/TRAINING PROGRAM

## 2024-07-22 PROCEDURE — 80048 BASIC METABOLIC PNL TOTAL CA: CPT | Performed by: NURSE PRACTITIONER

## 2024-07-22 PROCEDURE — 25000003 PHARM REV CODE 250: Performed by: NURSE PRACTITIONER

## 2024-07-22 PROCEDURE — 83020 HEMOGLOBIN ELECTROPHORESIS: CPT | Mod: 26,,, | Performed by: PATHOLOGY

## 2024-07-22 PROCEDURE — 25000003 PHARM REV CODE 250: Performed by: INTERNAL MEDICINE

## 2024-07-22 PROCEDURE — 99291 CRITICAL CARE FIRST HOUR: CPT | Mod: ,,, | Performed by: NURSE PRACTITIONER

## 2024-07-22 PROCEDURE — 84466 ASSAY OF TRANSFERRIN: CPT | Performed by: STUDENT IN AN ORGANIZED HEALTH CARE EDUCATION/TRAINING PROGRAM

## 2024-07-22 RX ORDER — POTASSIUM CHLORIDE 20 MEQ/1
40 TABLET, EXTENDED RELEASE ORAL EVERY 4 HOURS
Status: COMPLETED | OUTPATIENT
Start: 2024-07-22 | End: 2024-07-22

## 2024-07-22 RX ORDER — HYDRALAZINE HYDROCHLORIDE 25 MG/1
25 TABLET, FILM COATED ORAL EVERY 8 HOURS PRN
Status: DISCONTINUED | OUTPATIENT
Start: 2024-07-22 | End: 2024-07-23 | Stop reason: HOSPADM

## 2024-07-22 RX ORDER — INSULIN GLARGINE 100 [IU]/ML
10 INJECTION, SOLUTION SUBCUTANEOUS NIGHTLY
Status: DISCONTINUED | OUTPATIENT
Start: 2024-07-22 | End: 2024-07-23 | Stop reason: HOSPADM

## 2024-07-22 RX ORDER — FERROUS SULFATE 300 MG/5ML
300 LIQUID (ML) ORAL DAILY
Status: DISCONTINUED | OUTPATIENT
Start: 2024-07-22 | End: 2024-07-23 | Stop reason: HOSPADM

## 2024-07-22 RX ADMIN — BACLOFEN 10 MG: 10 TABLET ORAL at 09:07

## 2024-07-22 RX ADMIN — POTASSIUM CHLORIDE 40 MEQ: 1500 TABLET, EXTENDED RELEASE ORAL at 05:07

## 2024-07-22 RX ADMIN — GABAPENTIN 300 MG: 300 CAPSULE ORAL at 09:07

## 2024-07-22 RX ADMIN — MUPIROCIN: 20 OINTMENT TOPICAL at 09:07

## 2024-07-22 RX ADMIN — Medication 24 G: at 05:07

## 2024-07-22 RX ADMIN — LISINOPRIL 20 MG: 20 TABLET ORAL at 08:07

## 2024-07-22 RX ADMIN — INSULIN ASPART 4 UNITS: 100 INJECTION, SOLUTION INTRAVENOUS; SUBCUTANEOUS at 06:07

## 2024-07-22 RX ADMIN — SODIUM CHLORIDE: 9 INJECTION, SOLUTION INTRAVENOUS at 01:07

## 2024-07-22 RX ADMIN — ENOXAPARIN SODIUM 40 MG: 40 INJECTION SUBCUTANEOUS at 05:07

## 2024-07-22 RX ADMIN — ATORVASTATIN CALCIUM 40 MG: 40 TABLET, FILM COATED ORAL at 09:07

## 2024-07-22 RX ADMIN — INSULIN ASPART 2 UNITS: 100 INJECTION, SOLUTION INTRAVENOUS; SUBCUTANEOUS at 09:07

## 2024-07-22 RX ADMIN — INSULIN ASPART 2 UNITS: 100 INJECTION, SOLUTION INTRAVENOUS; SUBCUTANEOUS at 11:07

## 2024-07-22 RX ADMIN — Medication 300 MG: at 11:07

## 2024-07-22 RX ADMIN — HYDRALAZINE HYDROCHLORIDE 25 MG: 25 TABLET ORAL at 06:07

## 2024-07-22 RX ADMIN — POTASSIUM CHLORIDE 40 MEQ: 1500 TABLET, EXTENDED RELEASE ORAL at 08:07

## 2024-07-22 RX ADMIN — AMITRIPTYLINE HYDROCHLORIDE 10 MG: 10 TABLET, FILM COATED ORAL at 09:07

## 2024-07-22 RX ADMIN — ASPIRIN 81 MG: 81 TABLET, COATED ORAL at 08:07

## 2024-07-22 RX ADMIN — INSULIN GLARGINE 10 UNITS: 100 INJECTION, SOLUTION SUBCUTANEOUS at 09:07

## 2024-07-22 RX ADMIN — POTASSIUM CHLORIDE 40 MEQ: 1500 TABLET, EXTENDED RELEASE ORAL at 11:07

## 2024-07-22 RX ADMIN — MUPIROCIN: 20 OINTMENT TOPICAL at 08:07

## 2024-07-22 NOTE — PLAN OF CARE
Problem: Adult Inpatient Plan of Care  Goal: Plan of Care Review  Outcome: Progressing  Goal: Patient-Specific Goal (Individualized)  Outcome: Progressing  Goal: Absence of Hospital-Acquired Illness or Injury  Outcome: Progressing  Goal: Optimal Comfort and Wellbeing  Outcome: Progressing  Goal: Readiness for Transition of Care  Outcome: Progressing     Problem: Diabetic Ketoacidosis  Goal: Optimal Coping  Outcome: Progressing  Goal: Fluid and Electrolyte Balance with Absence of Ketosis  Outcome: Progressing     Problem: Diabetes Comorbidity  Goal: Blood Glucose Level Within Targeted Range  Outcome: Progressing     Problem: Skin Injury Risk Increased  Goal: Skin Health and Integrity  Outcome: Progressing     Problem: Fall Injury Risk  Goal: Absence of Fall and Fall-Related Injury  Outcome: Progressing

## 2024-07-22 NOTE — PROGRESS NOTES
[facility]  Wound Care    Patient Name:  Teodoro Moran   MRN:  [id number]  Date: 7/22/2024  Diagnosis: DKA (diabetic ketoacidosis)    History:     Past Medical History:   Diagnosis Date    CVA (cerebral vascular accident) 07/27/2022    Diabetes mellitus     Left-sided weakness        Social History     Socioeconomic History    Marital status: Single   Tobacco Use    Smoking status: Every Day     Current packs/day: 0.50     Types: Cigarettes    Smokeless tobacco: Never   Substance and Sexual Activity    Alcohol use: Never    Drug use: Yes     Frequency: 14.0 times per week     Types: Marijuana    Sexual activity: Yes     Social Determinants of Health     Financial Resource Strain: Low Risk  (7/22/2024)    Overall Financial Resource Strain (CARDIA)     Difficulty of Paying Living Expenses: Not hard at all   Food Insecurity: No Food Insecurity (7/22/2024)    Hunger Vital Sign     Worried About Running Out of Food in the Last Year: Never true     Ran Out of Food in the Last Year: Never true   Transportation Needs: No Transportation Needs (7/22/2024)    TRANSPORTATION NEEDS     Transportation : No   Physical Activity: Inactive (7/22/2024)    Exercise Vital Sign     Days of Exercise per Week: 0 days     Minutes of Exercise per Session: 0 min   Stress: No Stress Concern Present (7/22/2024)    [employer] - Occupational Stress Questionnaire     Feeling of Stress : Not at all   Housing Stability: Low Risk  (7/22/2024)    Housing Stability Vital Sign     Unable to Pay for Housing in the Last Year: No     Homeless in the Last Year: No       Precautions:     Allergies as of 07/21/2024    (No Known Allergies)       WOC Assessment Details/Treatment         Narrative: Seen patient for initiation of preventative skin care measures.     Patient up in chair. States has no problems noted. Ambulates in room.     Consult wound care for any skin care issues.       07/22/2024

## 2024-07-22 NOTE — ASSESSMENT & PLAN NOTE
Patient came in with nausea and vomiting secondary to her menstrual cycle   Anion gap closed; she was started on an ADA diet, resumed her long acting insulin last night and she was hypoglycemic this morning.   Lantus was decreased. After further conversation with pt she is not complaint with counting her carbs   Recommend outpt diabetic education

## 2024-07-22 NOTE — PLAN OF CARE
[facility] ICU  Initial Discharge Assessment       Primary Care Provider: No, Primary Doctor    Admission Diagnosis: DKA (diabetic ketoacidosis) [E11.10]    Admission Date: 7/21/2024  Expected Discharge Date:     Transition of Care Barriers: None    Payor: MEDICAID [place] / Plan: [place]ICA[place] [facility] [place] / Product Type: Managed Medicaid /     Extended Emergency Contact Information  Primary Emergency Contact: Ila Iniguez  Mobile Phone: [phone]  Relation: Grandparent  Secondary Emergency Contact: Verona Iniguez  Mobile Phone: [phone]  Relation: Mother    Discharge Plan A: Home with family  Discharge Plan B: Home with family      [facility] [address] [facility], [place] - [address]  [address]  [place] [address]  Phone: [phone] Fax: [phone]      Initial Assessment (most recent)       Adult Discharge Assessment - 07/22/24 1020          Discharge Assessment    Assessment Type Discharge Planning Assessment     Confirmed/corrected address, phone number and insurance Yes     Confirmed Demographics Correct on Facesheet     Source of Information patient     Communicated OLU with patient/caregiver Date not available/Unable to determine     Reason For Admission DKA     People in Home parent(s)     Do you expect to return to your current living situation? Yes     Do you have help at home or someone to help you manage your care at home? Yes     Who are your caregiver(s) and their phone number(s)? Mother     Prior to hospitilization cognitive status: Alert/Oriented     Current cognitive status: Alert/Oriented     Walking or Climbing Stairs Difficulty yes     Walking or Climbing Stairs ambulation difficulty, assistance 1 person;ambulation difficulty, requires equipment     Mobility Management Staight cane     Dressing/Bathing Difficulty yes     Dressing/Bathing bathing difficulty, assistance 1 person     Equipment Currently Used at Home cane, straight     Readmission within  30 days? No     Patient currently being followed by outpatient case management? No     Do you currently have service(s) that help you manage your care at home? No     Do you take prescription medications? Yes     Do you have prescription coverage? Yes     Do you have any problems affording any of your prescribed medications? No     Is the patient taking medications as prescribed? yes     How do you get to doctors appointments? family or friend will provide     Are you on dialysis? No     Do you take coumadin? No     Discharge Plan A Home with family     Discharge Plan B Home with family     DME Needed Upon Discharge  none     Discharge Plan discussed with: Parent(s);Patient     Transition of Care Barriers None        Physical Activity    On average, how many days per week do you engage in moderate to strenuous exercise (like a brisk walk)? 0 days     On average, how many minutes do you engage in exercise at this level? 0 min        Financial Resource Strain    How hard is it for you to pay for the very basics like food, housing, medical care, and heating? Not hard at all        Housing Stability    In the last 12 months, was there a time when you were not able to pay the mortgage or rent on time? No     At any time in the past 12 months, were you homeless or living in a shelter (including now)? No        Transportation Needs    Has the lack of transportation kept you from medical appointments, meetings, work or from getting things needed for daily living? No        Food Insecurity    Within the past 12 months, you worried that your food would run out before you got the money to buy more. Never true     Within the past 12 months, the food you bought just didn't last and you didn't have money to get more. Never true        Stress    Do you feel stress - tense, restless, nervous, or anxious, or unable to sleep at night because your mind is troubled all the time - these days? Not at all        Social Isolation    How  often do you feel lonely or isolated from those around you?  Never        Alcohol Use    Q1: How often do you have a drink containing alcohol? Never     Q2: How many drinks containing alcohol do you have on a typical day when you are drinking? Patient does not drink     Q3: How often do you have six or more drinks on one occasion? Never        Utilities    In the past 12 months has the electric, gas, oil, or water company threatened to shut off services in your home? No        Health Literacy    How often do you need to have someone help you when you read instructions, pamphlets, or other written material from your doctor or pharmacy? Never                   Spoke with pt and mother at bedside. Pt lives at home with mother, has a straight cane, not current with HH, and goes to outpt PT in [place] twice a week. Pt requires assistance with ADLS from her mother D/T (L0 sided weakness from a previous CVA. Pt plan is to return home with mother at d/c. No D/C needs noted at this time. SS following.

## 2024-07-22 NOTE — PROGRESS NOTES
[facility]  Critical Care Medicine  Progress Note    Patient Name: Teodoro Moran  MRN: [id number]  Admission Date: 7/21/2024  Hospital Length of Stay: 1 days  Code Status: Full Code  Attending Provider: Jenn Cedeno MD  Primary Care Provider: Carissa, Primary Doctor   Principal Problem: DKA (diabetic ketoacidosis)    Subjective:     HPI:  23-year-old female past history of ischemic stroke status post mechanical thrombectomy and decompressive craniotomy with left upper extremity weakness and contracture told she was have 1 diabetes presented with intractable nausea and vomiting with abdominal pain this morning she is not having any nausea vomiting    Hospital/ICU Course:  07/21/2024 switched off of DKA protocol  7/22- hypoglycemic this morning 39     Interval History/Significant Events:  up in chair, eating well, without complaints. , hypoglycemic this morning.     Review of Systems   All other systems reviewed and are negative.    Objective:     Vital Signs (Most Recent):  Temp: 98 °F (36.7 °C) (07/22/24 1200)  Pulse: (!) 52 (07/22/24 1700)  Resp: (!) 22 (07/22/24 1700)  BP: (!) 164/106 (07/22/24 1700)  SpO2: 100 % (07/22/24 1700) Vital Signs (24h Range):  Temp:  [97.7 °F (36.5 °C)-98.1 °F (36.7 °C)] 98 °F (36.7 °C)  Pulse:  [] 52  Resp:  [7-23] 22  SpO2:  [95 %-100 %] 100 %  BP: (122-164)/() 164/106   Weight: 58.4 kg (128 lb 12 oz)  Body mass index is 22.1 kg/m².      Intake/Output Summary (Last 24 hours) at 7/22/2024 1754  Last data filed at 7/22/2024 1122  Gross per 24 hour   Intake 1497.91 ml   Output 200 ml   Net 1297.91 ml          Physical Exam  Vitals reviewed.   HENT:      Right Ear: External ear normal.      Left Ear: External ear normal.      Mouth/Throat:      Mouth: Mucous membranes are moist.   Eyes:      Conjunctiva/sclera: Conjunctivae normal.   Cardiovascular:      Rate and Rhythm: Normal rate and regular rhythm.   Pulmonary:      Effort: Pulmonary effort is normal.       Breath sounds: Normal breath sounds.   Abdominal:      General: Bowel sounds are normal.      Palpations: Abdomen is soft.   Skin:     General: Skin is warm and dry.      Capillary Refill: Capillary refill takes less than 2 seconds.   Neurological:      Mental Status: She is alert and oriented to person, place, and time. Mental status is at baseline.   Psychiatric:         Mood and Affect: Mood normal.            Vents:     Lines/Drains/Airways       Peripheral Intravenous Line  Duration                  Peripheral IV - Single Lumen 07/21/24 0602 22 G Posterior;Right Hand 1 day                  Significant Labs:    CBC/Anemia Profile:  Recent Labs   Lab 07/20/24 2148 07/22/24  0649   WBC 13.29* 7.84   HGB 13.0 8.6*   HCT 41.3 27.2*    193   MCV 71.6* 71.4*   RDW 20.0* 19.0*   IRON  --  34*   FERRITIN  --  20        Chemistries:  Recent Labs   Lab 07/20/24 2148 07/21/24  0031 07/21/24  0525 07/21/24  0815 07/22/24  0649      < > 137 136 141   K 4.6   < > 4.8 4.2 3.3*   CL 97*   < > 111* 109* 112*   CO2 18*   < > 17* 18* 22   BUN 15   < > 13 12 7   CREATININE 0.95   < > 0.71 0.66 0.61   CALCIUM 9.9   < > 8.9 8.8 7.7*   ALBUMIN 4.9  --   --   --   --    PROT 9.8*  --   --   --   --    BILITOT 0.5  --   --   --   --    ALKPHOS 134  --   --   --   --    ALT 58*  --   --   --   --    AST 43*  --   --   --   --    MG  --   --  1.9  --   --    PHOS  --   --  2.9  --   --     < > = values in this interval not displayed.       All pertinent labs within the past 24 hours have been reviewed.    Significant Imaging:  I have reviewed all pertinent imaging results/findings within the past 24 hours.    ABG  Recent Labs   Lab 07/21/24  0026   PH 7.21*   PO2 29   PCO2 40*   HCO3 16.0*     Assessment/Plan:     Neuro  Ischemic stroke  Noted- on Asprin and statin     Cardiac/Vascular  Essential hypertension  Slightly on higher side     Renal/  Lactic acidosis  Resolved     Endocrine  * DKA (diabetic  ketoacidosis)  Patient came in with nausea and vomiting secondary to her menstrual cycle   Anion gap closed; she was started on an ADA diet, resumed her long acting insulin last night and she was hypoglycemic this morning.   Lantus was decreased. After further conversation with pt she is not complaint with counting her carbs   Recommend outpt diabetic education            Type 1 diabetes mellitus  Follows with endocrinology in [place]     GI  Nausea and vomiting  Resolved       Critical Care Daily Checklist:    A: Awake: RASS Goal/Actual Goal:    Actual:     B: Spontaneous Breathing Trial Performed?     C: SAT & SBT Coordinated?                        D: Delirium: CAM-ICU Overall CAM-ICU: Negative   E: Early Mobility Performed? Yes   F: Feeding Goal:    Status:     Current Diet Order   Procedures    Diet Consistent Carbohydrate 1800 Calorie     Order Specific Question:   Total calories:     Answer:   1800 Calorie      AS: Analgesia/Sedation    T: Thromboembolic Prophylaxis Lovenox    H: HOB > 300 Yes   U: Stress Ulcer Prophylaxis (if needed)    G: Glucose Control no   B: Bowel Function     I: Indwelling Catheter (Lines & Mehta) Necessity no   D: De-escalation of Antimicrobials/Pharmacotherapies na    Plan for the day/ETD     Code Status:  Family/Goals of Care: Full Code  Discussed with patient and mother      Critical Care Time: 35 minutes  Critical secondary to Patient has a condition that poses threat to life and bodily function: hypoglycemia and DKA      Critical care was time spent personally by me on the following activities: development of treatment plan with patient or surrogate and bedside caregivers, discussions with consultants, evaluation of patient's response to treatment, examination of patient, ordering and performing treatments and interventions, ordering and review of laboratory studies, ordering and review of radiographic studies, pulse oximetry, re-evaluation of patient's condition. This  critical care time did not overlap with that of any other provider or involve time for any procedures.     MALINDA Singleton-ACNP  Critical Care Medicine  [facility]

## 2024-07-22 NOTE — SUBJECTIVE & OBJECTIVE
Interval History/Significant Events:  up in chair, eating well, without complaints. , hypoglycemic this morning.     Review of Systems   All other systems reviewed and are negative.    Objective:     Vital Signs (Most Recent):  Temp: 98 °F (36.7 °C) (07/22/24 1200)  Pulse: (!) 52 (07/22/24 1700)  Resp: (!) 22 (07/22/24 1700)  BP: (!) 164/106 (07/22/24 1700)  SpO2: 100 % (07/22/24 1700) Vital Signs (24h Range):  Temp:  [97.7 °F (36.5 °C)-98.1 °F (36.7 °C)] 98 °F (36.7 °C)  Pulse:  [] 52  Resp:  [7-23] 22  SpO2:  [95 %-100 %] 100 %  BP: (122-164)/() 164/106   Weight: 58.4 kg (128 lb 12 oz)  Body mass index is 22.1 kg/m².      Intake/Output Summary (Last 24 hours) at 7/22/2024 1754  Last data filed at 7/22/2024 1122  Gross per 24 hour   Intake 1497.91 ml   Output 200 ml   Net 1297.91 ml          Physical Exam  Vitals reviewed.   HENT:      Right Ear: External ear normal.      Left Ear: External ear normal.      Mouth/Throat:      Mouth: Mucous membranes are moist.   Eyes:      Conjunctiva/sclera: Conjunctivae normal.   Cardiovascular:      Rate and Rhythm: Normal rate and regular rhythm.   Pulmonary:      Effort: Pulmonary effort is normal.      Breath sounds: Normal breath sounds.   Abdominal:      General: Bowel sounds are normal.      Palpations: Abdomen is soft.   Skin:     General: Skin is warm and dry.      Capillary Refill: Capillary refill takes less than 2 seconds.   Neurological:      Mental Status: She is alert and oriented to person, place, and time. Mental status is at baseline.   Psychiatric:         Mood and Affect: Mood normal.            Vents:     Lines/Drains/Airways       Peripheral Intravenous Line  Duration                  Peripheral IV - Single Lumen 07/21/24 0602 22 G Posterior;Right Hand 1 day                  Significant Labs:    CBC/Anemia Profile:  Recent Labs   Lab 07/20/24  2148 07/22/24  0649   WBC 13.29* 7.84   HGB 13.0 8.6*   HCT 41.3 27.2*    193   MCV 71.6* 71.4*    RDW 20.0* 19.0*   IRON  --  34*   FERRITIN  --  20        Chemistries:  Recent Labs   Lab 07/20/24  2148 07/21/24  0031 07/21/24  0525 07/21/24  0815 07/22/24  0649      < > 137 136 141   K 4.6   < > 4.8 4.2 3.3*   CL 97*   < > 111* 109* 112*   CO2 18*   < > 17* 18* 22   BUN 15   < > 13 12 7   CREATININE 0.95   < > 0.71 0.66 0.61   CALCIUM 9.9   < > 8.9 8.8 7.7*   ALBUMIN 4.9  --   --   --   --    PROT 9.8*  --   --   --   --    BILITOT 0.5  --   --   --   --    ALKPHOS 134  --   --   --   --    ALT 58*  --   --   --   --    AST 43*  --   --   --   --    MG  --   --  1.9  --   --    PHOS  --   --  2.9  --   --     < > = values in this interval not displayed.       All pertinent labs within the past 24 hours have been reviewed.    Significant Imaging:  I have reviewed all pertinent imaging results/findings within the past 24 hours.

## 2024-07-22 NOTE — NURSING
"Message received from SN Miley: "Patient's mother is bedside and wants to speak with Vigrinia"    Upon entering room, mother states she has not spoken with anyone and that no one has told her any information concerning her daughters levels or anything. Mother asks if patient can be discharged home.    Lab levels reviewed with mother and daughter. Mother informed that levels were reviewed with patient prior to giving her any medication (potassium and iron). Reminded mother that I spoke with her today via phone and in person and gave a detailed update. Mother asks why did patient's BG drop and she was here in a hospital. Mother reminded of previous conversation she and I had when she initially arrived today, patient was on insulin drip which was d/c'd yesterday, she received her long acting insulin last night. Her BG was checked prior to administering insulin and again after at the alloted time per protocol. Patient was immediately treated for hypglycemia and BG levels have remained 91 and above since then. Pt also reminded that Dr Cedeno did see patient and spoke with patient about her levels as well as her glucose, this was prior to mother's arrival. Encouraged her to ask any questions she has and that I will also inform attending Dr and NP to come speak with her while her mother is in the room today.  "

## 2024-07-22 NOTE — PROGRESS NOTES
Diet education for carbohydrate controlled diet completed. Patient voiced comprehension and no questions noted.

## 2024-07-23 VITALS
WEIGHT: 128.75 LBS | HEART RATE: 67 BPM | BODY MASS INDEX: 21.98 KG/M2 | RESPIRATION RATE: 18 BRPM | DIASTOLIC BLOOD PRESSURE: 102 MMHG | OXYGEN SATURATION: 99 % | HEIGHT: 64 IN | SYSTOLIC BLOOD PRESSURE: 154 MMHG | TEMPERATURE: 98 F

## 2024-07-23 LAB
ANION GAP SERPL CALCULATED.3IONS-SCNC: 9 MMOL/L (ref 7–16)
ANISOCYTOSIS BLD QL SMEAR: NORMAL
BASOPHILS # BLD AUTO: 0.01 K/UL (ref 0–0.2)
BASOPHILS NFR BLD AUTO: 0.1 % (ref 0–1)
BUN SERPL-MCNC: 7 MG/DL (ref 7–18)
BUN/CREAT SERPL: 12 (ref 6–20)
CALCIUM SERPL-MCNC: 8.3 MG/DL (ref 8.5–10.1)
CHLORIDE SERPL-SCNC: 109 MMOL/L (ref 98–107)
CO2 SERPL-SCNC: 23 MMOL/L (ref 21–32)
CREAT SERPL-MCNC: 0.6 MG/DL (ref 0.55–1.02)
DIFFERENTIAL METHOD BLD: ABNORMAL
EGFR (NO RACE VARIABLE) (RUSH/TITUS): 131 ML/MIN/1.73M2
EOSINOPHIL # BLD AUTO: 0.07 K/UL (ref 0–0.5)
EOSINOPHIL NFR BLD AUTO: 0.9 % (ref 1–4)
ERYTHROCYTE [DISTWIDTH] IN BLOOD BY AUTOMATED COUNT: 19 %
ERYTHROCYTE [DISTWIDTH] IN BLOOD BY AUTOMATED COUNT: 19.2 % (ref 11.5–14.5)
GLUCOSE SERPL-MCNC: 101 MG/DL (ref 70–105)
GLUCOSE SERPL-MCNC: 148 MG/DL (ref 70–105)
GLUCOSE SERPL-MCNC: 148 MG/DL (ref 70–105)
GLUCOSE SERPL-MCNC: 149 MG/DL (ref 70–105)
GLUCOSE SERPL-MCNC: 165 MG/DL (ref 70–105)
GLUCOSE SERPL-MCNC: 173 MG/DL (ref 70–105)
GLUCOSE SERPL-MCNC: 186 MG/DL (ref 70–105)
GLUCOSE SERPL-MCNC: 188 MG/DL (ref 70–105)
GLUCOSE SERPL-MCNC: 220 MG/DL (ref 70–105)
GLUCOSE SERPL-MCNC: 237 MG/DL (ref 70–105)
GLUCOSE SERPL-MCNC: 237 MG/DL (ref 70–105)
GLUCOSE SERPL-MCNC: 246 MG/DL (ref 74–106)
GLUCOSE SERPL-MCNC: 273 MG/DL (ref 70–105)
GLUCOSE SERPL-MCNC: 45 MG/DL (ref 70–105)
HCT VFR BLD AUTO: 26.8 %
HCT VFR BLD AUTO: 29.5 % (ref 38–47)
HGB A1 MFR BLD ELPH: 98.1 % (ref 95.8–98)
HGB A2 MFR BLD ELPH: 1.9 % (ref 2–3.3)
HGB BLD-MCNC: 8.5 G/DL
HGB BLD-MCNC: 9.3 G/DL (ref 12–16)
HYPOCHROMIA BLD QL SMEAR: NORMAL
IMM GRANULOCYTES # BLD AUTO: 0.02 K/UL (ref 0–0.04)
IMM GRANULOCYTES NFR BLD: 0.3 % (ref 0–0.4)
LYMPHOCYTES # BLD AUTO: 4.28 K/UL (ref 1–4.8)
LYMPHOCYTES NFR BLD AUTO: 57 % (ref 27–41)
MCH RBC QN AUTO: 22.6 PG (ref 27–31)
MCHC RBC AUTO-ENTMCNC: 31.5 G/DL (ref 32–36)
MCV RBC AUTO: 70.9 FL
MCV RBC AUTO: 71.8 FL (ref 80–96)
MICROCYTES BLD QL SMEAR: NORMAL
MONOCYTES # BLD AUTO: 0.47 K/UL (ref 0–0.8)
MONOCYTES NFR BLD AUTO: 6.3 % (ref 2–6)
MPC BLD CALC-MCNC: 10.5 FL (ref 9.4–12.4)
NEUTROPHILS # BLD AUTO: 2.66 K/UL (ref 1.8–7.7)
NEUTROPHILS NFR BLD AUTO: 35.4 % (ref 53–65)
NRBC # BLD AUTO: 0 X10E3/UL
NRBC, AUTO (.00): 0 %
PATH INTERP BLD-IMP: ABNORMAL
PLATELET # BLD AUTO: 200 K/UL (ref 150–400)
PLATELET MORPHOLOGY: NORMAL
POTASSIUM SERPL-SCNC: 4.8 MMOL/L (ref 3.5–5.1)
RBC # BLD AUTO: 3.78 M/UL
RBC # BLD AUTO: 4.11 M/UL (ref 4.2–5.4)
SODIUM SERPL-SCNC: 136 MMOL/L (ref 136–145)
TARGETS BLD QL SMEAR: NORMAL
WBC # BLD AUTO: 7.51 K/UL (ref 4.5–11)

## 2024-07-23 PROCEDURE — 99239 HOSP IP/OBS DSCHRG MGMT >30: CPT | Mod: ,,, | Performed by: NURSE PRACTITIONER

## 2024-07-23 PROCEDURE — 85025 COMPLETE CBC W/AUTO DIFF WBC: CPT | Performed by: NURSE PRACTITIONER

## 2024-07-23 PROCEDURE — 80048 BASIC METABOLIC PNL TOTAL CA: CPT | Performed by: NURSE PRACTITIONER

## 2024-07-23 PROCEDURE — 63600175 PHARM REV CODE 636 W HCPCS: Performed by: STUDENT IN AN ORGANIZED HEALTH CARE EDUCATION/TRAINING PROGRAM

## 2024-07-23 PROCEDURE — 25000003 PHARM REV CODE 250: Performed by: INTERNAL MEDICINE

## 2024-07-23 PROCEDURE — 82962 GLUCOSE BLOOD TEST: CPT

## 2024-07-23 PROCEDURE — 25000003 PHARM REV CODE 250: Performed by: STUDENT IN AN ORGANIZED HEALTH CARE EDUCATION/TRAINING PROGRAM

## 2024-07-23 PROCEDURE — 63600175 PHARM REV CODE 636 W HCPCS: Performed by: NURSE PRACTITIONER

## 2024-07-23 PROCEDURE — 36415 COLL VENOUS BLD VENIPUNCTURE: CPT | Performed by: NURSE PRACTITIONER

## 2024-07-23 RX ORDER — INSULIN GLARGINE 100 [IU]/ML
5 INJECTION, SOLUTION SUBCUTANEOUS DAILY
Status: DISCONTINUED | OUTPATIENT
Start: 2024-07-23 | End: 2024-07-23 | Stop reason: HOSPADM

## 2024-07-23 RX ADMIN — INSULIN ASPART 4 UNITS: 100 INJECTION, SOLUTION INTRAVENOUS; SUBCUTANEOUS at 05:07

## 2024-07-23 RX ADMIN — Medication 300 MG: at 08:07

## 2024-07-23 RX ADMIN — INSULIN GLARGINE 5 UNITS: 100 INJECTION, SOLUTION SUBCUTANEOUS at 08:07

## 2024-07-23 RX ADMIN — MUPIROCIN: 20 OINTMENT TOPICAL at 09:07

## 2024-07-23 RX ADMIN — LISINOPRIL 20 MG: 20 TABLET ORAL at 08:07

## 2024-07-23 RX ADMIN — ASPIRIN 81 MG: 81 TABLET, COATED ORAL at 08:07

## 2024-07-23 NOTE — DISCHARGE SUMMARY
[facility]  Critical Care Medicine  Discharge Summary      Patient Name: Teodoro Moran  MRN: [id number]  Admission Date: 7/21/2024  Hospital Length of Stay: 2 days  Discharge Date and Time:  07/23/2024 1:48 PM  Attending Physician: Carissa att. providers found   Discharging Provider: MALINDA Singleton-ACNP  Primary Care Provider: Carissa, Primary Doctor  Reason for Admission: DKA     HPI:   23-year-old female past history of ischemic stroke status post mechanical thrombectomy and decompressive craniotomy with left upper extremity weakness and contracture told she was have 1 diabetes presented with intractable nausea and vomiting with abdominal pain this morning she is not having any nausea vomiting    * No surgery found *    Indwelling Lines/Drains at Time of Discharge:   Lines/Drains/Airways       None                 Hospital Course:    Patient is a 23-year-old female with a history of ischemic stroke status post mechanical thrombectomy followed by decompressive craniotomy with resultant left upper extremity plegia and contracture along with essential hypertension and type 1 diabetes with a history of DKA who initially presented to [facility] ED complaining of intractable nausea vomiting followed by epigastric abdominal pain which started at around 2:00 p.m. yesterday afternoon.  Patient stated that she was vomiting partially digested food and denied vomiting any coffee-ground or blood.  Patient endorsed that after she stopped vomiting she started to experience epigastric pain which ultimately culminated in ED visit.      On initial presentation at [facility] ED, vital signs were stable and patient was afebrile.  Workup was notable for presence of hyperglycemia with blood glucose of 210, CO2 of 18, anion gap of 29, presence of ketone in urine, pH of 7.2, and lactic acid of 4.2.  Patient was given morphine for pain control and started on DKA protocol.       Patient was subsequently transferred to this  facility for higher level of care     07/21/2024 switched off of DKA protocol  7/22- hypoglycemic this morning 39     7/23 -- pt admitted for DKA. This is felt to be secondary to her vomiting with her menstrual cycle.  She did have an episode of hypoglycemia after restarting her long acting insulin. This was likely from her poor compliance with ADA diet at home verses her being on one in the hospital. This was discussed in detail with her and her mother who cares for her. Recommenced that she follow up with her endocrinologist and see diabetic educator outpatient.  She will be discharged home today.       Discharge time > 30 minutes   Consults (From admission, onward)          Status Ordering Provider     Inpatient consult to Registered Dietitian/Nutritionist  Once        Provider:  (Not yet assigned)    RACHNA Rivera          Significant Labs:  All pertinent labs within the past 24 hours have been reviewed.    Significant Imaging:  I have reviewed all pertinent imaging results/findings within the past 24 hours.    Pending Diagnostic Studies:       Procedure Component Value Units Date/Time    EXTRA TUBES [[id number]] Collected: 07/21/24 0525    Order Status: Sent Lab Status: In process Updated: 07/21/24 0546    Specimen: Blood, Venous     Narrative:      The following orders were created for panel order EXTRA TUBES.  Procedure                               Abnormality         Status                     ---------                               -----------         ------                     Lavender Top Hold[[id number]]                               In process                   Please view results for these tests on the individual orders.    Hemoglobin Electrophoresis [[id number]] Collected: 07/22/24 0649    Order Status: Sent Lab Status: In process Updated: 07/22/24 0815    Specimen: Blood           Final Active Diagnoses:    Diagnosis Date Noted POA    PRINCIPAL PROBLEM:  DKA (diabetic ketoacidosis) [E11.10]  07/21/2024 Yes    Type 1 diabetes mellitus [E10.9] 07/22/2024 Yes    Ischemic stroke [I63.9] 07/21/2024 Yes    Essential hypertension [I10] 07/21/2024 Yes    Lactic acidosis [E87.20] 07/21/2024 Yes    Nausea and vomiting [R11.2] 07/21/2024 Yes      Problems Resolved During this Admission:     No new Assessment & Plan notes have been filed under this hospital service since the last note was generated.  Service: Critical Care Medicine    Discharged Condition: stable    Disposition: Home or Self Care     Follow-up Information       No, Primary Doctor .                           Patient Instructions:   No discharge procedures on file.  Medications:    No current facility-administered medications for this encounter.     Current Outpatient Medications   Medication Sig Dispense Refill    amitriptyline (ELAVIL) 10 MG tablet Take 10 mg by mouth every evening.      aspirin (ECOTRIN) 81 MG EC tablet Take 1 tablet by mouth once daily.      atorvastatin (LIPITOR) 40 MG tablet Take 40 mg by mouth every evening.      baclofen (LIORESAL) 10 MG tablet Take 10 mg by mouth every evening.      dapagliflozin propanediol (FARXIGA ORAL) Take by mouth.      gabapentin (NEURONTIN) 300 MG capsule Take 300 mg by mouth every evening.      insulin aspart U-100 (NOVOLOG) 100 unit/mL (3 mL) InPn pen INJECT 14 UNITS WITH BREAKFAST AND LUNCH, AND 12 UNITS WITH DINNER. PLUS SLIDING SCALE: LESS THAN 200, NO EXTRA INSULIN; 201-250, 2 UNITS: 251-300, 4 UNITS: 301-350, 6 UNITS: 351-400, 8 UNITS: OVER 400, 10 UNITS. MAX DAILY DOSE 70 UNITS      LANTUS SOLOSTAR U-100 INSULIN glargine 100 units/mL SubQ pen Inject into the skin.      ondansetron (ZOFRAN-ODT) 4 MG TbDL Take 1 tablet (4 mg total) by mouth every 6 (six) hours as needed (nausea). 12 tablet 0        MARISSA Singleton  Critical Care Medicine  [facility]

## 2024-07-23 NOTE — PLAN OF CARE
Problem: Adult Inpatient Plan of Care  Goal: Plan of Care Review  Outcome: Progressing  Goal: Patient-Specific Goal (Individualized)  Outcome: Progressing  Goal: Absence of Hospital-Acquired Illness or Injury  Outcome: Progressing  Goal: Optimal Comfort and Wellbeing  Outcome: Progressing  Goal: Readiness for Transition of Care  Outcome: Progressing     Problem: Diabetic Ketoacidosis  Goal: Optimal Coping  Outcome: Progressing  Intervention: Support Wellbeing and Self-Management Success  Flowsheets (Taken 7/22/2024 2030)  Supportive Measures: active listening utilized  Family/Support System Care: involvement promoted  Goal: Fluid and Electrolyte Balance with Absence of Ketosis  Outcome: Progressing  Intervention: Monitor and Manage Ketoacidosis  Flowsheets (Taken 7/22/2024 2030)  Fluid/Electrolyte Management: fluids provided  Glycemic Management: blood glucose monitored     Problem: Fall Injury Risk  Goal: Absence of Fall and Fall-Related Injury  Outcome: Progressing  Intervention: Identify and Manage Contributors  Flowsheets (Taken 7/22/2024 2030)  Self-Care Promotion: independence encouraged  Medication Review/Management: medications reviewed  Intervention: Promote Injury-Free Environment  Flowsheets (Taken 7/22/2024 2030)  Safety Promotion/Fall Prevention:   assistive device/personal item within reach   instructed to call staff for mobility

## 2024-07-23 NOTE — PLAN OF CARE
[facility]  Discharge Final Note    Primary Care Provider: No, Primary Doctor    Expected Discharge Date: 7/23/2024    Final Discharge Note (most recent)       Final Note - 07/23/24 1155          Final Note    Assessment Type Final Discharge Note     Anticipated Discharge Disposition Home or Self Care        Post-Acute Status    Discharge Delays None known at this time                     Important Message from Medicare             Contact Info       No, Primary Doctor   Relationship: PCP - General        Next Steps: Follow up        Pt dc this day. 0 dc needs

## 2024-07-24 LAB — UA COMPLETE W REFLEX CULTURE PNL UR: ABNORMAL

## 2024-08-08 ENCOUNTER — HOSPITAL ENCOUNTER (EMERGENCY)
Facility: HOSPITAL | Age: 24
Discharge: HOME | End: 2024-08-08
Payer: MEDICAID

## 2024-08-08 ENCOUNTER — HOSPITAL ENCOUNTER (INPATIENT)
Facility: HOSPITAL | Age: 24
LOS: 2 days | Discharge: HOME | End: 2024-08-10
Attending: INTERNAL MEDICINE | Admitting: INTERNAL MEDICINE
Payer: MEDICAID

## 2024-08-08 VITALS
SYSTOLIC BLOOD PRESSURE: 139 MMHG | HEART RATE: 74 BPM | OXYGEN SATURATION: 100 % | WEIGHT: 129 LBS | TEMPERATURE: 98 F | HEIGHT: 64 IN | RESPIRATION RATE: 18 BRPM | DIASTOLIC BLOOD PRESSURE: 91 MMHG | BODY MASS INDEX: 22.02 KG/M2

## 2024-08-08 DIAGNOSIS — E10.10 DIABETIC KETOACIDOSIS WITHOUT COMA ASSOCIATED WITH TYPE 1 DIABETES MELLITUS: ICD-10-CM

## 2024-08-08 DIAGNOSIS — R11.2 NAUSEA AND VOMITING, UNSPECIFIED VOMITING TYPE: Primary | ICD-10-CM

## 2024-08-08 DIAGNOSIS — E83.39 HYPOPHOSPHATEMIA: ICD-10-CM

## 2024-08-08 DIAGNOSIS — E11.10 LACTIC ACIDOSIS DUE TO DIABETES MELLITUS: ICD-10-CM

## 2024-08-08 DIAGNOSIS — E10.10 DIABETIC KETOACIDOSIS WITHOUT COMA ASSOCIATED WITH TYPE 1 DIABETES MELLITUS: Primary | ICD-10-CM

## 2024-08-08 DIAGNOSIS — I69.354 HEMIPARESIS AFFECTING LEFT SIDE AS LATE EFFECT OF STROKE: ICD-10-CM

## 2024-08-08 DIAGNOSIS — E11.10 DKA (DIABETIC KETOACIDOSIS): ICD-10-CM

## 2024-08-08 LAB
ACETONE SERPL QL SCN: NEGATIVE
ALBUMIN SERPL BCP-MCNC: 4.1 G/DL (ref 3.5–5)
ALBUMIN/GLOB SERPL: 0.9 {RATIO}
ALP SERPL-CCNC: 106 U/L (ref 52–144)
ALT SERPL W P-5'-P-CCNC: 31 U/L (ref 13–56)
ANION GAP SERPL CALCULATED.3IONS-SCNC: 15 MMOL/L (ref 7–16)
ANION GAP SERPL CALCULATED.3IONS-SCNC: 19 MMOL/L (ref 7–16)
ANION GAP SERPL CALCULATED.3IONS-SCNC: 21 MMOL/L (ref 7–16)
AST SERPL W P-5'-P-CCNC: 26 U/L (ref 15–37)
BACTERIA #/AREA URNS HPF: ABNORMAL /HPF
BASOPHILS # BLD AUTO: 0.02 K/UL (ref 0–0.2)
BASOPHILS NFR BLD AUTO: 0.3 % (ref 0–1)
BILIRUB SERPL-MCNC: 0.4 MG/DL (ref ?–1.2)
BILIRUB UR QL STRIP: NEGATIVE
BUN SERPL-MCNC: 10 MG/DL (ref 7–18)
BUN SERPL-MCNC: 9 MG/DL (ref 7–18)
BUN SERPL-MCNC: 9 MG/DL (ref 7–18)
BUN/CREAT SERPL: 12 (ref 6–20)
BUN/CREAT SERPL: 12 (ref 6–20)
BUN/CREAT SERPL: 14 (ref 6–20)
CALCIUM SERPL-MCNC: 8.3 MG/DL (ref 8.5–10.1)
CALCIUM SERPL-MCNC: 8.8 MG/DL (ref 8.5–10.1)
CALCIUM SERPL-MCNC: 8.9 MG/DL (ref 8.5–10.1)
CHLORIDE SERPL-SCNC: 106 MMOL/L (ref 98–107)
CHLORIDE SERPL-SCNC: 95 MMOL/L (ref 98–107)
CHLORIDE SERPL-SCNC: 98 MMOL/L (ref 98–107)
CLARITY UR: CLEAR
CO2 SERPL-SCNC: 17 MMOL/L (ref 21–32)
CO2 SERPL-SCNC: 19 MMOL/L (ref 21–32)
CO2 SERPL-SCNC: 21 MMOL/L (ref 21–32)
COLOR UR: YELLOW
CREAT SERPL-MCNC: 0.73 MG/DL (ref 0.55–1.02)
CREAT SERPL-MCNC: 0.75 MG/DL (ref 0.55–1.02)
CREAT SERPL-MCNC: 0.76 MG/DL (ref 0.55–1.02)
DIFFERENTIAL METHOD BLD: ABNORMAL
EGFR (NO RACE VARIABLE) (RUSH/TITUS): 114 ML/MIN/1.73M2
EGFR (NO RACE VARIABLE) (RUSH/TITUS): 116 ML/MIN/1.73M2
EGFR (NO RACE VARIABLE) (RUSH/TITUS): 120 ML/MIN/1.73M2
EOSINOPHIL # BLD AUTO: 0.1 K/UL (ref 0–0.5)
EOSINOPHIL NFR BLD AUTO: 1.7 % (ref 1–4)
ERYTHROCYTE [DISTWIDTH] IN BLOOD BY AUTOMATED COUNT: 20.7 % (ref 11.5–14.5)
GLOBULIN SER-MCNC: 4.7 G/DL (ref 2–4)
GLUCOSE SERPL-MCNC: 134 MG/DL (ref 74–106)
GLUCOSE SERPL-MCNC: 138 MG/DL (ref 70–105)
GLUCOSE SERPL-MCNC: 141 MG/DL (ref 74–106)
GLUCOSE SERPL-MCNC: 147 MG/DL (ref 70–105)
GLUCOSE SERPL-MCNC: 161 MG/DL (ref 70–105)
GLUCOSE SERPL-MCNC: 166 MG/DL (ref 74–106)
GLUCOSE SERPL-MCNC: 168 MG/DL (ref 70–105)
GLUCOSE SERPL-MCNC: 183 MG/DL (ref 70–105)
GLUCOSE UR STRIP-MCNC: NEGATIVE MG/DL
HCG UR QL IA.RAPID: NEGATIVE
HCO3 UR-SCNC: 14 MMOL/L (ref 24–28)
HCO3 UR-SCNC: 16.4 MMOL/L (ref 24–28)
HCO3 UR-SCNC: 7.1 MMOL/L (ref 24–28)
HCT VFR BLD AUTO: 38.9 % (ref 38–47)
HGB BLD-MCNC: 12.1 G/DL (ref 12–16)
KETONES UR STRIP-SCNC: >=160 MG/DL
LACTATE SERPL-SCNC: 1.4 MMOL/L (ref 0.4–2)
LACTATE SERPL-SCNC: 1.9 MMOL/L (ref 0.4–2)
LACTATE SERPL-SCNC: 3.2 MMOL/L (ref 0.4–2)
LACTATE SERPL-SCNC: 3.5 MMOL/L (ref 0.4–2)
LEUKOCYTE ESTERASE UR QL STRIP: NEGATIVE
LYMPHOCYTES # BLD AUTO: 1.5 K/UL (ref 1–4.8)
LYMPHOCYTES NFR BLD AUTO: 26 % (ref 27–41)
MAGNESIUM SERPL-MCNC: 1.9 MG/DL (ref 1.7–2.3)
MCH RBC QN AUTO: 22.3 PG (ref 27–31)
MCHC RBC AUTO-ENTMCNC: 31.1 G/DL (ref 32–36)
MCV RBC AUTO: 71.6 FL (ref 80–96)
MONOCYTES # BLD AUTO: 0.26 K/UL (ref 0–0.8)
MONOCYTES NFR BLD AUTO: 4.5 % (ref 2–6)
MPC BLD CALC-MCNC: 10.2 FL (ref 9.4–12.4)
NEUTROPHILS # BLD AUTO: 3.9 K/UL (ref 1.8–7.7)
NEUTROPHILS NFR BLD AUTO: 67.5 % (ref 53–65)
NITRITE UR QL STRIP: NEGATIVE
PCO2 BLDA: 21 MMHG (ref 41–51)
PCO2 BLDA: 35 MMHG (ref 41–51)
PCO2 BLDA: 47 MMHG (ref 41–51)
PH SMN: 7.14 [PH] (ref 7.32–7.42)
PH SMN: 7.15 [PH] (ref 7.32–7.42)
PH SMN: 7.21 [PH] (ref 7.32–7.42)
PH UR STRIP: 6 PH UNITS
PHOSPHATE SERPL-MCNC: 2 MG/DL (ref 2.5–4.5)
PLATELET # BLD AUTO: 144 K/UL (ref 150–400)
PO2 BLDA: 16 MMHG (ref 25–40)
PO2 BLDA: 32 MMHG (ref 25–40)
PO2 BLDA: 35 MMHG (ref 25–40)
POC BASE EXCESS: -12.2 MMOL/L (ref -2–3)
POC BASE EXCESS: -12.9 MMOL/L (ref -2–3)
POC BASE EXCESS: -20.1 MMOL/L (ref -2–3)
POC SATURATED O2: 12 %
POC SATURATED O2: 41 %
POC SATURATED O2: 53 %
POTASSIUM SERPL-SCNC: 4.4 MMOL/L (ref 3.5–5.1)
POTASSIUM SERPL-SCNC: 4.4 MMOL/L (ref 3.5–5.1)
POTASSIUM SERPL-SCNC: 5.1 MMOL/L (ref 3.5–5.1)
PROT SERPL-MCNC: 8.8 G/DL (ref 6.4–8.2)
PROT UR QL STRIP: 30
RBC # BLD AUTO: 5.43 M/UL (ref 4.2–5.4)
RBC # UR STRIP: NEGATIVE /UL
RBC #/AREA URNS HPF: ABNORMAL /HPF
SARS-COV-2 RDRP RESP QL NAA+PROBE: NEGATIVE
SODIUM SERPL-SCNC: 131 MMOL/L (ref 136–145)
SODIUM SERPL-SCNC: 133 MMOL/L (ref 136–145)
SODIUM SERPL-SCNC: 134 MMOL/L (ref 136–145)
SP GR UR STRIP: 1.02
SQUAMOUS #/AREA URNS LPF: ABNORMAL /LPF
TRICHOMONAS #/AREA URNS HPF: ABNORMAL /HPF
UROBILINOGEN UR STRIP-ACNC: 0.2 MG/DL
WBC # BLD AUTO: 5.78 K/UL (ref 4.5–11)
WBC #/AREA URNS HPF: ABNORMAL /HPF
YEAST #/AREA URNS HPF: ABNORMAL /HPF

## 2024-08-08 PROCEDURE — 96361 HYDRATE IV INFUSION ADD-ON: CPT

## 2024-08-08 PROCEDURE — 80053 COMPREHEN METABOLIC PANEL: CPT | Performed by: NURSE PRACTITIONER

## 2024-08-08 PROCEDURE — 85025 COMPLETE CBC W/AUTO DIFF WBC: CPT | Performed by: NURSE PRACTITIONER

## 2024-08-08 PROCEDURE — 87635 SARS-COV-2 COVID-19 AMP PRB: CPT | Performed by: NURSE PRACTITIONER

## 2024-08-08 PROCEDURE — 96365 THER/PROPH/DIAG IV INF INIT: CPT

## 2024-08-08 PROCEDURE — S5010 5% DEXTROSE AND 0.45% SALINE: HCPCS | Performed by: NURSE PRACTITIONER

## 2024-08-08 PROCEDURE — 82962 GLUCOSE BLOOD TEST: CPT

## 2024-08-08 PROCEDURE — 83735 ASSAY OF MAGNESIUM: CPT | Performed by: HOSPITALIST

## 2024-08-08 PROCEDURE — 96375 TX/PRO/DX INJ NEW DRUG ADDON: CPT

## 2024-08-08 PROCEDURE — 99285 EMERGENCY DEPT VISIT HI MDM: CPT | Mod: ,,, | Performed by: NURSE PRACTITIONER

## 2024-08-08 PROCEDURE — 25000003 PHARM REV CODE 250: Performed by: INTERNAL MEDICINE

## 2024-08-08 PROCEDURE — 36415 COLL VENOUS BLD VENIPUNCTURE: CPT | Performed by: HOSPITALIST

## 2024-08-08 PROCEDURE — 20000000 HC ICU ROOM

## 2024-08-08 PROCEDURE — 25000003 PHARM REV CODE 250: Performed by: NURSE PRACTITIONER

## 2024-08-08 PROCEDURE — 81025 URINE PREGNANCY TEST: CPT | Performed by: NURSE PRACTITIONER

## 2024-08-08 PROCEDURE — 84100 ASSAY OF PHOSPHORUS: CPT | Performed by: HOSPITALIST

## 2024-08-08 PROCEDURE — 82803 BLOOD GASES ANY COMBINATION: CPT

## 2024-08-08 PROCEDURE — 25000003 PHARM REV CODE 250: Performed by: HOSPITALIST

## 2024-08-08 PROCEDURE — 63600175 PHARM REV CODE 636 W HCPCS: Performed by: HOSPITALIST

## 2024-08-08 PROCEDURE — 83605 ASSAY OF LACTIC ACID: CPT | Performed by: NURSE PRACTITIONER

## 2024-08-08 PROCEDURE — 99284 EMERGENCY DEPT VISIT MOD MDM: CPT | Mod: 25

## 2024-08-08 PROCEDURE — 81003 URINALYSIS AUTO W/O SCOPE: CPT | Performed by: NURSE PRACTITIONER

## 2024-08-08 PROCEDURE — 81001 URINALYSIS AUTO W/SCOPE: CPT | Performed by: NURSE PRACTITIONER

## 2024-08-08 PROCEDURE — 63600175 PHARM REV CODE 636 W HCPCS: Performed by: NURSE PRACTITIONER

## 2024-08-08 PROCEDURE — 83036 HEMOGLOBIN GLYCOSYLATED A1C: CPT | Performed by: HOSPITALIST

## 2024-08-08 PROCEDURE — 99223 1ST HOSP IP/OBS HIGH 75: CPT | Mod: ,,, | Performed by: HOSPITALIST

## 2024-08-08 PROCEDURE — 83605 ASSAY OF LACTIC ACID: CPT | Performed by: HOSPITALIST

## 2024-08-08 PROCEDURE — 82009 KETONE BODYS QUAL: CPT | Performed by: HOSPITALIST

## 2024-08-08 RX ORDER — BISACODYL 5 MG
10 TABLET, DELAYED RELEASE (ENTERIC COATED) ORAL DAILY PRN
Status: DISCONTINUED | OUTPATIENT
Start: 2024-08-08 | End: 2024-08-10 | Stop reason: HOSPADM

## 2024-08-08 RX ORDER — AMITRIPTYLINE HYDROCHLORIDE 10 MG/1
10 TABLET, FILM COATED ORAL NIGHTLY
Status: DISCONTINUED | OUTPATIENT
Start: 2024-08-08 | End: 2024-08-10 | Stop reason: HOSPADM

## 2024-08-08 RX ORDER — IBUPROFEN 200 MG
24 TABLET ORAL
Status: DISCONTINUED | OUTPATIENT
Start: 2024-08-08 | End: 2024-08-10 | Stop reason: HOSPADM

## 2024-08-08 RX ORDER — GUAIFENESIN AND DEXTROMETHORPHAN HYDROBROMIDE 10; 100 MG/5ML; MG/5ML
10 SYRUP ORAL EVERY 6 HOURS PRN
Status: DISCONTINUED | OUTPATIENT
Start: 2024-08-08 | End: 2024-08-10 | Stop reason: HOSPADM

## 2024-08-08 RX ORDER — TRAZODONE HYDROCHLORIDE 50 MG/1
50 TABLET ORAL NIGHTLY PRN
Status: DISCONTINUED | OUTPATIENT
Start: 2024-08-08 | End: 2024-08-10 | Stop reason: HOSPADM

## 2024-08-08 RX ORDER — AMLODIPINE BESYLATE 10 MG/1
10 TABLET ORAL
Status: DISCONTINUED | OUTPATIENT
Start: 2024-08-08 | End: 2024-08-08

## 2024-08-08 RX ORDER — LISINOPRIL 5 MG/1
5 TABLET ORAL DAILY
Status: DISCONTINUED | OUTPATIENT
Start: 2024-08-09 | End: 2024-08-10 | Stop reason: HOSPADM

## 2024-08-08 RX ORDER — BACLOFEN 10 MG/1
10 TABLET ORAL NIGHTLY
Status: DISCONTINUED | OUTPATIENT
Start: 2024-08-08 | End: 2024-08-10 | Stop reason: HOSPADM

## 2024-08-08 RX ORDER — DEXTROSE MONOHYDRATE AND SODIUM CHLORIDE 5; .45 G/100ML; G/100ML
INJECTION, SOLUTION INTRAVENOUS CONTINUOUS PRN
Status: DISCONTINUED | OUTPATIENT
Start: 2024-08-08 | End: 2024-08-08 | Stop reason: HOSPADM

## 2024-08-08 RX ORDER — ASPIRIN 81 MG/1
81 TABLET ORAL DAILY
Status: DISCONTINUED | OUTPATIENT
Start: 2024-08-09 | End: 2024-08-10 | Stop reason: HOSPADM

## 2024-08-08 RX ORDER — LISINOPRIL 5 MG/1
5 TABLET ORAL
Status: COMPLETED | OUTPATIENT
Start: 2024-08-08 | End: 2024-08-08

## 2024-08-08 RX ORDER — INSULIN GLARGINE 100 [IU]/ML
25 INJECTION, SOLUTION SUBCUTANEOUS NIGHTLY
Status: DISCONTINUED | OUTPATIENT
Start: 2024-08-08 | End: 2024-08-10 | Stop reason: HOSPADM

## 2024-08-08 RX ORDER — MUPIROCIN 20 MG/G
OINTMENT TOPICAL 2 TIMES DAILY
Status: DISCONTINUED | OUTPATIENT
Start: 2024-08-08 | End: 2024-08-10 | Stop reason: HOSPADM

## 2024-08-08 RX ORDER — GABAPENTIN 300 MG/1
300 CAPSULE ORAL NIGHTLY
Status: DISCONTINUED | OUTPATIENT
Start: 2024-08-08 | End: 2024-08-10 | Stop reason: HOSPADM

## 2024-08-08 RX ORDER — INSULIN ASPART 100 [IU]/ML
0-10 INJECTION, SOLUTION INTRAVENOUS; SUBCUTANEOUS
Status: DISCONTINUED | OUTPATIENT
Start: 2024-08-08 | End: 2024-08-10 | Stop reason: HOSPADM

## 2024-08-08 RX ORDER — LISINOPRIL 5 MG/1
5 TABLET ORAL
COMMUNITY
Start: 2024-03-18

## 2024-08-08 RX ORDER — SODIUM CHLORIDE 0.9 % (FLUSH) 0.9 %
10 SYRINGE (ML) INJECTION
Status: DISCONTINUED | OUTPATIENT
Start: 2024-08-08 | End: 2024-08-08 | Stop reason: HOSPADM

## 2024-08-08 RX ORDER — MORPHINE SULFATE 4 MG/ML
2 INJECTION, SOLUTION INTRAMUSCULAR; INTRAVENOUS
Status: COMPLETED | OUTPATIENT
Start: 2024-08-08 | End: 2024-08-08

## 2024-08-08 RX ORDER — ONDANSETRON HYDROCHLORIDE 2 MG/ML
8 INJECTION, SOLUTION INTRAVENOUS EVERY 6 HOURS PRN
Status: DISCONTINUED | OUTPATIENT
Start: 2024-08-08 | End: 2024-08-10 | Stop reason: HOSPADM

## 2024-08-08 RX ORDER — AMLODIPINE BESYLATE 10 MG/1
10 TABLET ORAL DAILY
COMMUNITY
End: 2024-08-08

## 2024-08-08 RX ORDER — IBUPROFEN 200 MG
16 TABLET ORAL
Status: DISCONTINUED | OUTPATIENT
Start: 2024-08-08 | End: 2024-08-10 | Stop reason: HOSPADM

## 2024-08-08 RX ORDER — ONDANSETRON HYDROCHLORIDE 2 MG/ML
4 INJECTION, SOLUTION INTRAVENOUS
Status: COMPLETED | OUTPATIENT
Start: 2024-08-08 | End: 2024-08-08

## 2024-08-08 RX ORDER — ATORVASTATIN CALCIUM 40 MG/1
40 TABLET, FILM COATED ORAL NIGHTLY
Status: DISCONTINUED | OUTPATIENT
Start: 2024-08-08 | End: 2024-08-10 | Stop reason: HOSPADM

## 2024-08-08 RX ORDER — TALC
6 POWDER (GRAM) TOPICAL NIGHTLY PRN
Status: DISCONTINUED | OUTPATIENT
Start: 2024-08-08 | End: 2024-08-10 | Stop reason: HOSPADM

## 2024-08-08 RX ORDER — SODIUM CHLORIDE 9 MG/ML
INJECTION, SOLUTION INTRAVENOUS CONTINUOUS
Status: DISPENSED | OUTPATIENT
Start: 2024-08-08 | End: 2024-08-09

## 2024-08-08 RX ORDER — ACETAMINOPHEN 500 MG
1000 TABLET ORAL EVERY 6 HOURS PRN
Status: DISCONTINUED | OUTPATIENT
Start: 2024-08-08 | End: 2024-08-10 | Stop reason: HOSPADM

## 2024-08-08 RX ORDER — GLUCAGON 1 MG
1 KIT INJECTION
Status: DISCONTINUED | OUTPATIENT
Start: 2024-08-08 | End: 2024-08-10 | Stop reason: HOSPADM

## 2024-08-08 RX ORDER — SIMETHICONE 80 MG
1 TABLET,CHEWABLE ORAL 3 TIMES DAILY PRN
Status: DISCONTINUED | OUTPATIENT
Start: 2024-08-08 | End: 2024-08-10 | Stop reason: HOSPADM

## 2024-08-08 RX ADMIN — Medication 6 MG: at 09:08

## 2024-08-08 RX ADMIN — ONDANSETRON 4 MG: 2 INJECTION INTRAMUSCULAR; INTRAVENOUS at 01:08

## 2024-08-08 RX ADMIN — GABAPENTIN 300 MG: 300 CAPSULE ORAL at 09:08

## 2024-08-08 RX ADMIN — ATORVASTATIN CALCIUM 40 MG: 40 TABLET, FILM COATED ORAL at 09:08

## 2024-08-08 RX ADMIN — INSULIN HUMAN 0.05 UNITS/KG/HR: 1 INJECTION, SOLUTION INTRAVENOUS at 05:08

## 2024-08-08 RX ADMIN — LISINOPRIL 5 MG: 5 TABLET ORAL at 04:08

## 2024-08-08 RX ADMIN — SODIUM CHLORIDE 1000 ML: 9 INJECTION, SOLUTION INTRAVENOUS at 01:08

## 2024-08-08 RX ADMIN — INSULIN GLARGINE 25 UNITS: 100 INJECTION, SOLUTION SUBCUTANEOUS at 09:08

## 2024-08-08 RX ADMIN — ACETAMINOPHEN 1000 MG: 500 TABLET ORAL at 08:08

## 2024-08-08 RX ADMIN — MUPIROCIN: 20 OINTMENT TOPICAL at 08:08

## 2024-08-08 RX ADMIN — SODIUM CHLORIDE 1000 ML: 9 INJECTION, SOLUTION INTRAVENOUS at 03:08

## 2024-08-08 RX ADMIN — ONDANSETRON 8 MG: 2 INJECTION INTRAMUSCULAR; INTRAVENOUS at 10:08

## 2024-08-08 RX ADMIN — BACLOFEN 10 MG: 10 TABLET ORAL at 09:08

## 2024-08-08 RX ADMIN — MORPHINE SULFATE 2 MG: 4 INJECTION INTRAVENOUS at 02:08

## 2024-08-08 RX ADMIN — TRAZODONE HYDROCHLORIDE 50 MG: 50 TABLET ORAL at 09:08

## 2024-08-08 RX ADMIN — AMITRIPTYLINE HYDROCHLORIDE 10 MG: 10 TABLET, FILM COATED ORAL at 09:08

## 2024-08-08 RX ADMIN — SODIUM CHLORIDE: 9 INJECTION, SOLUTION INTRAVENOUS at 08:08

## 2024-08-08 RX ADMIN — DEXTROSE AND SODIUM CHLORIDE: 5; 450 INJECTION, SOLUTION INTRAVENOUS at 05:08

## 2024-08-08 NOTE — ED NOTES
Pt taken to bathroom via wheelchair, vomited while in restroom. NP notified.  Back to bed at this time.   RT at bedside with RN to obtain POCT venous blood gas.   Pt back on monitors at this time. Mother at bedside.

## 2024-08-08 NOTE — ED PROVIDER NOTES
Encounter Date: 8/8/2024       History     Chief Complaint   Patient presents with    Blood Sugar Problem    Vomiting     23 yr old AAF with PMH of DM, HLD, CVA and craniotomy (2yr ago -with left side weakness) to ED with c/o Nausea, vomiting for the past 30 minutes.  Also reports sudden onset headache twitching of eyes.  Reports has had episodes of eye twitching since craniotomy.  Pt with hx of DKA 3 weeks ago- was transferred to [facility] at that time.    The history is provided by the patient.   Emesis   This is a new problem. The current episode started just prior to arrival. The problem occurs 2 - 4 times per day. The problem has been unchanged. The emesis has an appearance of stomach contents. Associated symptoms include headaches. Pertinent negatives include no abdominal pain.     Review of patient's allergies indicates:  No Known Allergies  Past Medical History:   Diagnosis Date    CVA (cerebral vascular accident) 07/27/2022    Diabetes mellitus     Left-sided weakness      Past Surgical History:   Procedure Laterality Date    CRANIOTOMY  07/2022     No family history on file.  Social History     Tobacco Use    Smoking status: Every Day     Current packs/day: 0.50     Types: Cigarettes    Smokeless tobacco: Never   Substance Use Topics    Alcohol use: Never    Drug use: Yes     Frequency: 14.0 times per week     Types: Marijuana     Review of Systems   Constitutional: Negative.    Respiratory: Negative.     Cardiovascular: Negative.    Gastrointestinal:  Positive for nausea and vomiting. Negative for abdominal pain.   Musculoskeletal: Negative.    Skin: Negative.    Neurological:  Positive for headaches.       Physical Exam     Initial Vitals [08/08/24 1256]   BP Pulse Resp Temp SpO2   (!) 177/119 89 20 97.3 °F (36.3 °C) 95 %      MAP       --         Physical Exam    Nursing note and vitals reviewed.  Constitutional: She appears well-developed and well-nourished.   Cardiovascular:  Normal rate and regular  rhythm.           Pulmonary/Chest: Breath sounds normal.   Abdominal: Abdomen is soft. There is no abdominal tenderness.   Musculoskeletal:         General: Normal range of motion.      Comments: Contractures to left upper and lower extremities residual from CVA/ craniotomy     Neurological: She is alert and oriented to person, place, and time. GCS score is 15. GCS eye subscore is 4. GCS verbal subscore is 5. GCS motor subscore is 6.   Skin: Skin is warm and dry.         Medical Screening Exam   See Full Note    ED Course   Procedures  Labs Reviewed   COMPREHENSIVE METABOLIC PANEL - Abnormal       Result Value    Sodium 133 (*)     Potassium 4.4      Chloride 95 (*)     CO2 21      Anion Gap 21 (*)     Glucose 134 (*)     BUN 9      Creatinine 0.76      BUN/Creatinine Ratio 12      Calcium 8.9      Total Protein 8.8 (*)     Albumin 4.1      Globulin 4.7 (*)     A/G Ratio 0.9      Bilirubin, Total 0.4      Alk Phos 106      ALT 31      AST 26      eGFR 114     CBC WITH DIFFERENTIAL - Abnormal    WBC 5.78      RBC 5.43 (*)     Hemoglobin 12.1      Hematocrit 38.9      MCV 71.6 (*)     MCH 22.3 (*)     MCHC 31.1 (*)     RDW 20.7 (*)     Platelet Count 144 (*)     MPV 10.2      Neutrophils % 67.5 (*)     Lymphocytes % 26.0 (*)     Neutrophils, Abs 3.90      Lymphocytes, Absolute 1.50      Diff Type Scan Smear      Monocytes % 4.5      Eosinophils % 1.7      Basophils % 0.3      Monocytes, Absolute 0.26      Eosinophils, Absolute 0.10      Basophils, Absolute 0.02     URINALYSIS - Abnormal    Color, UA Yellow      Clarity, UA Clear      pH, UA 6.0      Leukocytes, UA Negative      Nitrites, UA Negative      Protein, UA 30 (*)     Glucose, UA Negative      Ketones, UA >=160 (*)     Urobilinogen, UA 0.2      Bilirubin, UA Negative      Blood, UA Negative      Specific Gravity, UA 1.025     URINALYSIS, MICROSCOPIC - Abnormal    WBC, UA 0-5      RBC, UA None Seen      Bacteria, UA Rare      Yeast, UA None Seen      Squamous  Epithelial Cells, UA Few (*)     Trichomonas, UA None Seen     BASIC METABOLIC PANEL - Abnormal    Sodium 131 (*)     Potassium 5.1      Chloride 98      CO2 19 (*)     Anion Gap 19 (*)     Glucose 166 (*)     BUN 10      Creatinine 0.73      BUN/Creatinine Ratio 14      Calcium 8.3 (*)     eGFR 120     LACTIC ACID, PLASMA - Abnormal    Lactic Acid 3.2 (*)    POCT GLUCOSE MONITORING CONTINUOUS - Abnormal    POC Glucose 138 (*)    POCT GLUCOSE MONITORING CONTINUOUS - Abnormal    POC Glucose 147 (*)    POCT GLUCOSE MONITORING CONTINUOUS - Abnormal    POC Glucose 183 (*)    POCT GLUCOSE MONITORING CONTINUOUS - Abnormal    POC Glucose 168 (*)    LACTIC ACID, PLASMA - Normal    Lactic Acid 1.4     HCG QUALITATIVE URINE - Normal    HCG Qualitative, Urine Negative     SARS-COV-2 RNA AMPLIFICATION, QUAL - Normal    SARS COV-2 Molecular Negative      Narrative:     Negative SARS-CoV results should not be used as the sole basis for treatment or patient management decisions; negative results should be considered in the context of a patient's recent exposures, history and the presene of clinical signs and symptoms consistent with COVID-19.  Negative results should be treated as presumptive and confirmed by molecular assay, if necessary for patient management.   CBC W/ AUTO DIFFERENTIAL    Narrative:     The following orders were created for panel order CBC Auto Differential.  Procedure                               Abnormality         Status                     ---------                               -----------         ------                     CBC with Differential[[id number]]       Abnormal            Final result                 Please view results for these tests on the individual orders.   LACTIC ACID, PLASMA   POCT GLUCOSE MONITORING CONTINUOUS   POCT GLUCOSE MONITORING CONTINUOUS          Imaging Results              CT Head Without Contrast (Final result)  Result time 08/08/24 13:37:26      Final result by Andrew  Jad DANIELLE II, MD (08/08/24 13:37:26)                   Impression:      No evidence of acute process demonstrated.      Electronically signed by: Jad Morales  Date:    08/08/2024  Time:    13:37               Narrative:    EXAMINATION:  CT HEAD WITHOUT CONTRAST    CLINICAL HISTORY:  Headache, sudden, severe;    TECHNIQUE:  Axial CT imaging of the brain is performed without contrast with 3 mm increments.    CT dose reduction technique used - Dose Rite and tube current modulation.    COMPARISON:  None available    FINDINGS:  No evidence of hemorrhage, mass, mass effect, midline shift or acute infarct seen.  Encephalomalacia of the majority of the right cerebral hemisphere with overlying craniotomy defect.  Remaining brain parenchyma attenuation and differentiation appears within normal limits for age. The ventricles and cisterns are normal in caliber.  No other cranial or skull base abnormality is identified.                                       Medications   sodium chloride 0.9% flush 10 mL (has no administration in time range)   dextrose 5 % and 0.45 % NaCl infusion ( Intravenous New Bag 8/8/24 1633)   dextrose 50% injection 25 g (has no administration in time range)   dextrose 50% injection 12.5 g (has no administration in time range)   insulin regular in 0.9 % NaCl 100 unit/100 mL (1 unit/mL) infusion (0.05 Units/kg/hr × 58.5 kg Intravenous New Bag 8/8/24 3862)   sodium chloride 0.9% bolus 1,000 mL 1,000 mL (0 mLs Intravenous Stopped 8/8/24 1453)   ondansetron injection 4 mg (4 mg Intravenous Given 8/8/24 1318)   morphine injection 2 mg (2 mg Intravenous Given 8/8/24 1434)   sodium chloride 0.9% bolus 1,000 mL 1,000 mL (0 mLs Intravenous Stopped 8/8/24 1609)   lisinopriL tablet 5 mg (5 mg Oral Given 8/8/24 1612)     Medical Decision Making  Amount and/or Complexity of Data Reviewed  Labs: ordered. Decision-making details documented in ED Course.  Radiology: ordered.    Risk  Prescription drug  management.               ED Course as of 08/08/24 1853   Thu Aug 08, 2024   1449 Discussed pt status and POC with Dr Cavazos, [facility] telemed, who suggest another 1000 ml NS, po challenge and recheck labs after fluids. If labs are still ok and pt is improving may DC home [CG]   1608 BP(!): 153/95  Pt did not take BP meds today due to N/V, will treat now that she is tolerating PO fluids.  [CG]   1715 Discussed repeat labs with Dr. Gordon, [facility] who agrees she needs to transfer for DKA but they are at capacity. [CG]   1840 POC Glucose(!): 168  Improving, will continue to monitor. [CG]   1852 EMS here to transport to [facility].   [CG]      ED Course User Index  [CG] Aminta Rene FNP                           Clinical Impression:   Final diagnoses:  [R11.2] Nausea and vomiting, unspecified vomiting type (Primary)  [E10.10] Diabetic ketoacidosis without coma associated with type 1 diabetes mellitus        ED Disposition Condition    Transfer to Another Facility Serious                Aminta Rene FNP  08/08/24 1853

## 2024-08-08 NOTE — ED NOTES
[facility] arrived to take patient to [place]. Report given to Georgette Galeana.

## 2024-08-08 NOTE — ED NOTES
LACHELLE Rene NP on phone with MD at this time for Telemed consult.  Cart is already set up in room.

## 2024-08-08 NOTE — ED TRIAGE NOTES
Pt to ER via wheelchair, c/o Nausea/Vomiting 30 min PTA, Blood Sugar at home was 147. Pt states she has a history of DKA and is concerned that is what is wrong today.   Pt is Aox4. RR even non-labored. Blood glucose 138.   Mother at bedside.

## 2024-08-09 PROBLEM — E83.39 HYPOPHOSPHATEMIA: Status: ACTIVE | Noted: 2024-08-09

## 2024-08-09 LAB
ANION GAP SERPL CALCULATED.3IONS-SCNC: 15 MMOL/L (ref 7–16)
ANISOCYTOSIS BLD QL SMEAR: NORMAL
BASOPHILS # BLD AUTO: 0.02 K/UL (ref 0–0.2)
BASOPHILS NFR BLD AUTO: 0.2 % (ref 0–1)
BUN SERPL-MCNC: 8 MG/DL (ref 7–18)
BUN/CREAT SERPL: 11 (ref 6–20)
CALCIUM SERPL-MCNC: 8.7 MG/DL (ref 8.5–10.1)
CHLORIDE SERPL-SCNC: 103 MMOL/L (ref 98–107)
CO2 SERPL-SCNC: 17 MMOL/L (ref 21–32)
CREAT SERPL-MCNC: 0.73 MG/DL (ref 0.55–1.02)
DIFFERENTIAL METHOD BLD: ABNORMAL
EGFR (NO RACE VARIABLE) (RUSH/TITUS): 120 ML/MIN/1.73M2
EOSINOPHIL # BLD AUTO: 0.01 K/UL (ref 0–0.5)
EOSINOPHIL NFR BLD AUTO: 0.1 % (ref 1–4)
ERYTHROCYTE [DISTWIDTH] IN BLOOD BY AUTOMATED COUNT: 20.3 % (ref 11.5–14.5)
EST. AVERAGE GLUCOSE BLD GHB EST-MCNC: 154 MG/DL
GLUCOSE SERPL-MCNC: 162 MG/DL (ref 70–105)
GLUCOSE SERPL-MCNC: 170 MG/DL (ref 70–105)
GLUCOSE SERPL-MCNC: 177 MG/DL (ref 74–106)
GLUCOSE SERPL-MCNC: 198 MG/DL (ref 70–105)
GLUCOSE SERPL-MCNC: 217 MG/DL (ref 70–105)
GLUCOSE SERPL-MCNC: 248 MG/DL (ref 70–105)
GLUCOSE SERPL-MCNC: 255 MG/DL (ref 70–105)
GLUCOSE SERPL-MCNC: 341 MG/DL (ref 70–105)
HBA1C MFR BLD HPLC: 7 % (ref 4.5–6.6)
HCT VFR BLD AUTO: 35.5 % (ref 38–47)
HGB BLD-MCNC: 10.7 G/DL (ref 12–16)
HYPOCHROMIA BLD QL SMEAR: NORMAL
IMM GRANULOCYTES # BLD AUTO: 0.05 K/UL (ref 0–0.04)
IMM GRANULOCYTES NFR BLD: 0.4 % (ref 0–0.4)
LYMPHOCYTES # BLD AUTO: 1.77 K/UL (ref 1–4.8)
LYMPHOCYTES NFR BLD AUTO: 14.8 % (ref 27–41)
MAGNESIUM SERPL-MCNC: 1.9 MG/DL (ref 1.7–2.3)
MCH RBC QN AUTO: 21.8 PG (ref 27–31)
MCHC RBC AUTO-ENTMCNC: 30.1 G/DL (ref 32–36)
MCV RBC AUTO: 72.4 FL (ref 80–96)
MICROCYTES BLD QL SMEAR: NORMAL
MONOCYTES # BLD AUTO: 0.63 K/UL (ref 0–0.8)
MONOCYTES NFR BLD AUTO: 5.3 % (ref 2–6)
MPC BLD CALC-MCNC: 9.1 FL (ref 9.4–12.4)
NEUTROPHILS # BLD AUTO: 9.52 K/UL (ref 1.8–7.7)
NEUTROPHILS NFR BLD AUTO: 79.2 % (ref 53–65)
NRBC # BLD AUTO: 0 X10E3/UL
NRBC, AUTO (.00): 0 %
OVALOCYTES BLD QL SMEAR: NORMAL
PHOSPHATE SERPL-MCNC: 3.4 MG/DL (ref 2.5–4.5)
PLATELET # BLD AUTO: 276 K/UL (ref 150–400)
PLATELET MORPHOLOGY: NORMAL
POLYCHROMASIA BLD QL SMEAR: NORMAL
POTASSIUM SERPL-SCNC: 4.4 MMOL/L (ref 3.5–5.1)
RBC # BLD AUTO: 4.9 M/UL (ref 4.2–5.4)
SODIUM SERPL-SCNC: 131 MMOL/L (ref 136–145)
WBC # BLD AUTO: 12 K/UL (ref 4.5–11)

## 2024-08-09 PROCEDURE — 63600175 PHARM REV CODE 636 W HCPCS: Performed by: HOSPITALIST

## 2024-08-09 PROCEDURE — 11000001 HC ACUTE MED/SURG PRIVATE ROOM

## 2024-08-09 PROCEDURE — 25000003 PHARM REV CODE 250: Performed by: HOSPITALIST

## 2024-08-09 PROCEDURE — 83735 ASSAY OF MAGNESIUM: CPT | Performed by: HOSPITALIST

## 2024-08-09 PROCEDURE — 80048 BASIC METABOLIC PNL TOTAL CA: CPT | Performed by: HOSPITALIST

## 2024-08-09 PROCEDURE — 99232 SBSQ HOSP IP/OBS MODERATE 35: CPT | Mod: ,,, | Performed by: INTERNAL MEDICINE

## 2024-08-09 PROCEDURE — 82962 GLUCOSE BLOOD TEST: CPT

## 2024-08-09 PROCEDURE — 87040 BLOOD CULTURE FOR BACTERIA: CPT | Performed by: HOSPITALIST

## 2024-08-09 PROCEDURE — 94761 N-INVAS EAR/PLS OXIMETRY MLT: CPT

## 2024-08-09 PROCEDURE — 85025 COMPLETE CBC W/AUTO DIFF WBC: CPT | Performed by: HOSPITALIST

## 2024-08-09 PROCEDURE — 84100 ASSAY OF PHOSPHORUS: CPT | Performed by: HOSPITALIST

## 2024-08-09 PROCEDURE — 36415 COLL VENOUS BLD VENIPUNCTURE: CPT | Performed by: HOSPITALIST

## 2024-08-09 RX ORDER — ATORVASTATIN CALCIUM 20 MG/1
40 TABLET, FILM COATED ORAL NIGHTLY
COMMUNITY
Start: 2024-08-03

## 2024-08-09 RX ORDER — AMITRIPTYLINE HYDROCHLORIDE 25 MG/1
25 TABLET, FILM COATED ORAL NIGHTLY
COMMUNITY

## 2024-08-09 RX ORDER — INSULIN GLARGINE 100 [IU]/ML
30 INJECTION, SOLUTION SUBCUTANEOUS NIGHTLY
Status: CANCELLED | OUTPATIENT
Start: 2024-08-09

## 2024-08-09 RX ORDER — INSULIN GLARGINE-YFGN 100 [IU]/ML
18 INJECTION, SOLUTION SUBCUTANEOUS EVERY MORNING
COMMUNITY
Start: 2024-08-03

## 2024-08-09 RX ORDER — NAPROXEN SODIUM 220 MG/1
1 TABLET, FILM COATED ORAL DAILY
COMMUNITY
Start: 2024-05-10

## 2024-08-09 RX ADMIN — INSULIN ASPART 2 UNITS: 100 INJECTION, SOLUTION INTRAVENOUS; SUBCUTANEOUS at 06:08

## 2024-08-09 RX ADMIN — SODIUM CHLORIDE: 9 INJECTION, SOLUTION INTRAVENOUS at 05:08

## 2024-08-09 RX ADMIN — GABAPENTIN 300 MG: 300 CAPSULE ORAL at 09:08

## 2024-08-09 RX ADMIN — INSULIN ASPART 8 UNITS: 100 INJECTION, SOLUTION INTRAVENOUS; SUBCUTANEOUS at 04:08

## 2024-08-09 RX ADMIN — ASPIRIN 81 MG: 81 TABLET, COATED ORAL at 08:08

## 2024-08-09 RX ADMIN — BACLOFEN 10 MG: 10 TABLET ORAL at 09:08

## 2024-08-09 RX ADMIN — INSULIN GLARGINE 25 UNITS: 100 INJECTION, SOLUTION SUBCUTANEOUS at 09:08

## 2024-08-09 RX ADMIN — ACETAMINOPHEN 1000 MG: 500 TABLET ORAL at 02:08

## 2024-08-09 RX ADMIN — INSULIN ASPART 4 UNITS: 100 INJECTION, SOLUTION INTRAVENOUS; SUBCUTANEOUS at 11:08

## 2024-08-09 RX ADMIN — LISINOPRIL 5 MG: 5 TABLET ORAL at 08:08

## 2024-08-09 RX ADMIN — ATORVASTATIN CALCIUM 40 MG: 40 TABLET, FILM COATED ORAL at 09:08

## 2024-08-09 RX ADMIN — MUPIROCIN: 20 OINTMENT TOPICAL at 10:08

## 2024-08-09 RX ADMIN — INSULIN ASPART 3 UNITS: 100 INJECTION, SOLUTION INTRAVENOUS; SUBCUTANEOUS at 10:08

## 2024-08-09 RX ADMIN — POTASSIUM PHOSPHATE, MONOBASIC POTASSIUM PHOSPHATE, DIBASIC 15 MMOL: 224; 236 INJECTION, SOLUTION, CONCENTRATE INTRAVENOUS at 01:08

## 2024-08-09 RX ADMIN — MUPIROCIN: 20 OINTMENT TOPICAL at 08:08

## 2024-08-09 RX ADMIN — AMITRIPTYLINE HYDROCHLORIDE 10 MG: 10 TABLET, FILM COATED ORAL at 09:08

## 2024-08-09 NOTE — H&P
[facility] Hospital Medicine  History & Physical    Patient Name: Teodoro Moran  MRN: [id number]  Patient Class: IP- Inpatient  Admission Date: 8/8/2024  Attending Physician: Masood Brown MD   Primary Care Provider: Carissa, Primary Doctor         Patient information was obtained from patient, past medical records, and ER records.     Subjective:     Principal Problem:Diabetic ketoacidosis without coma associated with type 1 diabetes mellitus    Chief Complaint: No chief complaint on file.       HPI: 24 yo F presents to [facility] ED with N/V since this AM.  She is COVID/FLU negative and states she has been compliant with her meds and that she felt fine when she went to bed last night.  She has been in DKA previously and was just discharged from [facility] ICU two weeks ago (see Shannon Islas ACNP, note).  CO2 19 (most likely from lactic acidosis) and AG 21 upon arrival at [facility].  She was started on insulin infusion and transferred.  Here she has resolved her DKA with AG 15 and her  upon arrival.  N/V resolved.  Tolerating diet.  She still had a lactic and metabolic acidosis but her serum acetone is negative.      Patient with LUE contraction from large ischemic RMCA stroke two years ago.  She states that she follows with [facility] and that workup had been negative and they through it was possibly due to her OCP and a yeast infection she had at the time. She has a pronounced left facial droop and a mild dysarthria.  She has been working with PT and has almost been able to give up her wheelchair.  Photos of heels and sacrum done at [facility] and no obvious infection noted.  UA also negative for infection.      Remainder of ROS as below.   See assessment and plan below for problem based evaluation      Past Medical History:   Diagnosis Date    Arterial ischemic stroke, MCA (middle cerebral artery), right, acute     Chronic headache disorder     [facility] Dr. Chaidez     Hemiparesis affecting left side as late effect of stroke     Type 1 diabetes mellitus        Past Surgical History:   Procedure Laterality Date    CRANIOTOMY  07/2022       Review of patient's allergies indicates:  No Known Allergies    Current Facility-Administered Medications on File Prior to Encounter   Medication    [COMPLETED] lisinopriL tablet 5 mg    [COMPLETED] morphine injection 2 mg    [COMPLETED] ondansetron injection 4 mg    [COMPLETED] sodium chloride 0.9% bolus 1,000 mL 1,000 mL    [COMPLETED] sodium chloride 0.9% bolus 1,000 mL 1,000 mL    [DISCONTINUED] amLODIPine tablet 10 mg    [DISCONTINUED] dextrose 5 % and 0.45 % NaCl infusion    [DISCONTINUED] dextrose 50% injection 12.5 g    [DISCONTINUED] dextrose 50% injection 25 g    [DISCONTINUED] insulin regular in 0.9 % NaCl 100 unit/100 mL (1 unit/mL) infusion    [DISCONTINUED] sodium chloride 0.9% flush 10 mL     Current Outpatient Medications on File Prior to Encounter   Medication Sig    amitriptyline (ELAVIL) 10 MG tablet Take 10 mg by mouth every evening.    aspirin (ECOTRIN) 81 MG EC tablet Take 1 tablet by mouth once daily.    atorvastatin (LIPITOR) 40 MG tablet Take 40 mg by mouth every evening.    baclofen (LIORESAL) 10 MG tablet Take 10 mg by mouth every evening.    dapagliflozin propanediol (FARXIGA ORAL) Take by mouth.    gabapentin (NEURONTIN) 300 MG capsule Take 300 mg by mouth every evening.    insulin aspart U-100 (NOVOLOG) 100 unit/mL (3 mL) InPn pen INJECT 14 UNITS WITH BREAKFAST AND LUNCH, AND 12 UNITS WITH DINNER. PLUS SLIDING SCALE: LESS THAN 200, NO EXTRA INSULIN; 201-250, 2 UNITS: 251-300, 4 UNITS: 301-350, 6 UNITS: 351-400, 8 UNITS: OVER 400, 10 UNITS. MAX DAILY DOSE 70 UNITS    LANTUS SOLOSTAR U-100 INSULIN glargine 100 units/mL SubQ pen Inject into the skin.    lisinopriL (PRINIVIL,ZESTRIL) 5 MG tablet 5 mg.    ondansetron (ZOFRAN-ODT) 4 MG TbDL Take 1 tablet (4 mg total) by mouth every 6 (six) hours as needed (nausea).     [DISCONTINUED] amLODIPine (NORVASC) 10 MG tablet 10 mg once daily.     Family History    None       Tobacco Use    Smoking status: Every Day     Current packs/day: 0.50     Types: Cigarettes    Smokeless tobacco: Never   Substance and Sexual Activity    Alcohol use: Never    Drug use: Yes     Frequency: 14.0 times per week     Types: Marijuana    Sexual activity: Yes     Review of Systems   Constitutional:  Negative for appetite change, chills, fatigue, fever and unexpected weight change.   HENT:  Negative for congestion, mouth sores, nosebleeds, sinus pain, sore throat and trouble swallowing.    Respiratory:  Negative for apnea, cough, chest tightness and shortness of breath.    Cardiovascular:  Negative for chest pain, palpitations and leg swelling.   Gastrointestinal:  Positive for nausea and vomiting. Negative for abdominal pain, blood in stool, constipation and diarrhea.   Genitourinary:  Negative for decreased urine volume, difficulty urinating, dysuria and frequency.   Musculoskeletal:  Negative for arthralgias, back pain and neck pain.   Skin:  Negative for rash.   Neurological:  Positive for headaches. Negative for syncope and light-headedness.   Hematological:  Does not bruise/bleed easily.   Psychiatric/Behavioral:  Negative for confusion and suicidal ideas.      Objective:     Vital Signs (Most Recent):  Temp: 97.8 °F (36.6 °C) (08/08/24 2032)  Pulse: 85 (08/08/24 2130)  Resp: 17 (08/08/24 2130)  BP: 136/73 (08/08/24 2130)  SpO2: 98 % (08/08/24 2130) Vital Signs (24h Range):  Temp:  [97.3 °F (36.3 °C)-97.8 °F (36.6 °C)] 97.8 °F (36.6 °C)  Pulse:  [66-95] 85  Resp:  [15-20] 17  SpO2:  [95 %-100 %] 98 %  BP: (135-179)/() 136/73     Weight: 60.4 kg (133 lb 2.5 oz)  Body mass index is 22.86 kg/m².     Physical Exam  Constitutional:       Appearance: Normal appearance.   HENT:      Head: Atraumatic.      Mouth/Throat:      Mouth: Mucous membranes are dry.      Pharynx: Oropharynx is clear.   Eyes:       Conjunctiva/sclera: Conjunctivae normal.      Pupils: Pupils are equal, round, and reactive to light.   Neck:      Vascular: No carotid bruit.   Cardiovascular:      Rate and Rhythm: Normal rate and regular rhythm.      Pulses: Normal pulses.      Heart sounds: Normal heart sounds.   Pulmonary:      Effort: Pulmonary effort is normal.      Breath sounds: Normal breath sounds.   Abdominal:      General: Abdomen is flat. Bowel sounds are normal.      Palpations: Abdomen is soft.      Tenderness: There is no abdominal tenderness.   Musculoskeletal:         General: No deformity or signs of injury. Normal range of motion.      Cervical back: Neck supple.      Left lower leg: No edema.   Skin:     General: Skin is warm and dry.      Capillary Refill: Capillary refill takes less than 2 seconds.      Coloration: Skin is not jaundiced or pale.      Findings: No bruising, lesion or rash.   Neurological:      Mental Status: She is alert and oriented to person, place, and time. Mental status is at baseline.   Psychiatric:         Mood and Affect: Mood normal.              CRANIAL NERVES     CN III, IV, VI   Pupils are equal, round, and reactive to light.       Significant Labs: All pertinent labs within the past 24 hours have been reviewed.    Significant Imaging: I have reviewed all pertinent imaging results/findings within the past 24 hours.  Assessment/Plan:     * Diabetic ketoacidosis without coma associated with type 1 diabetes mellitus  Resolved at present.  Will continue IVF and start a basal/bolus insulin.    Recheck BMP, Mag and Phos in am.  Replacing phosphorous now.    Will check A1C.  Was just discharged two weeks ago in DKA.      Patient with condition that if not treated could result in loss of life or bodily function.  Due to co morbidities this patient has complex medical management.        Lactic acidosis due to diabetes mellitus  Could be from gastroenteritis.  Symptoms improving.    If develops diarrhea will  obtain stool cultures.    Will obtain blood cultures as meets sepsis criteria.   Will repeat lactic acid in am after IVF.    Most likely this is viral in origin and will add a procalcitonin level.        Hemiparesis affecting left side as late effect of stroke  Continue home baclofen, asa, statin and low dose ACEI.        VTE Risk Mitigation (From admission, onward)           Ordered     Place LORIE hose  Until discontinued         08/08/24 2035                                    Kaci Juares MD  Department of Hospital Medicine  [facility]

## 2024-08-09 NOTE — HPI
24 yo F presents to [facility] ED with N/V since this AM.  She is COVID/FLU negative and states she has been compliant with her meds and that she felt fine when she went to bed last night.  She has been in DKA previously and was just discharged from [facility] ICU two weeks ago (see Shannon Islas, ACNP, note).  CO2 19 (most likely from lactic acidosis) and AG 21 upon arrival at [facility].  She was started on insulin infusion and transferred.  Here she has resolved her DKA with AG 15 and her  upon arrival.  N/V resolved.  Tolerating diet.  She still had a lactic and metabolic acidosis but her serum acetone is negative.      Patient with LUE contraction from large ischemic RMCA stroke two years ago.  She states that she follows with [facility] and that workup had been negative and they through it was possibly due to her OCP and a yeast infection she had at the time. She has a pronounced left facial droop and a mild dysarthria.  She has been working with PT and has almost been able to give up her wheelchair.  Photos of heels and sacrum done at [facility] and no obvious infection noted.  UA also negative for infection.      Remainder of ROS as below.   See assessment and plan below for problem based evaluation

## 2024-08-09 NOTE — ASSESSMENT & PLAN NOTE
Could be from gastroenteritis.  Symptoms improving.    If develops diarrhea will obtain stool cultures.    Will obtain blood cultures as meets sepsis criteria.   Will repeat lactic acid in am after IVF.    Most likely this is viral in origin and will add a procalcitonin level.

## 2024-08-09 NOTE — HPI
23-year-old female presented with nausea and vomiting negative COVID flu.  History but old stroke with contracture of the left upper extremity she denies any fever or chills been eating okay she is taking her medicines like she was supposed to .  Negative pregnancy test urine remarkable no chest pain

## 2024-08-09 NOTE — ASSESSMENT & PLAN NOTE
Resolved at present.  Will continue IVF and start a basal/bolus insulin.    Recheck BMP, Mag and Phos in am.  Replacing phosphorous now.    Will check A1C.  Was just discharged two weeks ago in DKA.      Patient with condition that if not treated could result in loss of life or bodily function.  Due to co morbidities this patient has complex medical management.

## 2024-08-09 NOTE — SUBJECTIVE & OBJECTIVE
Interval History/Significant Events:  Patient without complaints    Review of Systems  Objective:     Vital Signs (Most Recent):  Temp: 98.4 °F (36.9 °C) (08/09/24 0308)  Pulse: 88 (08/09/24 0400)  Resp: 16 (08/09/24 0400)  BP: 117/60 (08/09/24 0400)  SpO2: 100 % (08/09/24 0400) Vital Signs (24h Range):  Temp:  [97.3 °F (36.3 °C)-99 °F (37.2 °C)] 98.4 °F (36.9 °C)  Pulse:  [] 88  Resp:  [12-27] 16  SpO2:  [89 %-100 %] 100 %  BP: ()/() 117/60   Weight: 60.6 kg (133 lb 9.6 oz)  Body mass index is 22.93 kg/m².      Intake/Output Summary (Last 24 hours) at 8/9/2024 0559  Last data filed at 8/9/2024 0547  Gross per 24 hour   Intake 1358.34 ml   Output --   Net 1358.34 ml          Physical Exam  Vitals reviewed.   Constitutional:       Appearance: Normal appearance.      Interventions: She is not intubated.  HENT:      Head: Normocephalic and atraumatic.      Nose: Nose normal.      Mouth/Throat:      Mouth: Mucous membranes are dry.      Pharynx: Oropharynx is clear.   Eyes:      Extraocular Movements: Extraocular movements intact.      Conjunctiva/sclera: Conjunctivae normal.      Pupils: Pupils are equal, round, and reactive to light.   Cardiovascular:      Rate and Rhythm: Normal rate.      Heart sounds: Normal heart sounds. No murmur heard.  Pulmonary:      Effort: Pulmonary effort is normal. She is not intubated.      Breath sounds: Normal breath sounds.   Abdominal:      General: Abdomen is flat. Bowel sounds are normal.      Palpations: Abdomen is soft.   Musculoskeletal:         General: Normal range of motion.      Cervical back: Normal range of motion and neck supple.      Right lower leg: No edema.      Left lower leg: No edema.   Skin:     General: Skin is warm and dry.      Capillary Refill: Capillary refill takes less than 2 seconds.   Neurological:      General: No focal deficit present.      Mental Status: She is alert and oriented to person, place, and time.   Psychiatric:         Mood  and Affect: Mood normal.         Behavior: Behavior normal.            Vents:     Lines/Drains/Airways       Peripheral Intravenous Line  Duration                  Peripheral IV - Single Lumen 08/08/24 2042 20 G Anterior;Proximal;Right Forearm <1 day         Peripheral IV - Single Lumen 08/08/24 2042 20 G Right Antecubital <1 day                  Significant Labs:    CBC/Anemia Profile:  Recent Labs   Lab 08/08/24  1315 08/09/24  0419   WBC 5.78 12.00*   HGB 12.1 10.7*   HCT 38.9 35.5*   * 276   MCV 71.6* 72.4*   RDW 20.7* 20.3*        Chemistries:  Recent Labs   Lab 08/08/24  1315 08/08/24  1629 08/08/24 2046 08/09/24 0419   * 131* 134* 131*   K 4.4 5.1 4.4 4.4   CL 95* 98 106 103   CO2 21 19* 17* 17*   BUN 9 10 9 8   CREATININE 0.76 0.73 0.75 0.73   CALCIUM 8.9 8.3* 8.8 8.7   ALBUMIN 4.1  --   --   --    PROT 8.8*  --   --   --    BILITOT 0.4  --   --   --    ALKPHOS 106  --   --   --    ALT 31  --   --   --    AST 26  --   --   --    MG  --   --  1.9 1.9   PHOS  --   --  2.0* 3.4       Recent Lab Results  (Last 5 results in the past 24 hours)        08/09/24  0419   08/09/24  0256   08/08/24  2346   08/08/24  2257   08/08/24  2046        Acetone, Bld         Negative       Anion Gap 15         15       Aniso 2+               Baso # 0.02               Basophil % 0.2               BUN 8         9       BUN/CREAT RATIO 11         12       Calcium 8.7         8.8       Chloride 103         106       CO2 17         17       Creatinine 0.73         0.75       Differential Method Scan Smear               eGFR 120         116       Eos # 0.01               Eos % 0.1               Estimated Avg Glucose         154       Glucose 177         141       Hematocrit 35.5               Hemoglobin 10.7               Hemoglobin A1C External         7.0  Comment:   Normal:               <5.7%  Pre-Diabetic:       5.7% to 6.4%  Diabetic:             >6.4%  Diabetic Goal:     <7%       Hypo Few               Immature  Grans (Abs) 0.05               Immature Granulocytes 0.4               Lactic Acid Level       1.9   3.5  Comment: A repeat order for Lactic Acid has been placed for collection in 2 hours.       Lymph # 1.77               Lymph % 14.8               Magnesium  1.9         1.9       MCH 21.8               MCHC 30.1               MCV 72.4               Microcytosis 1+               Mono # 0.63               Mono % 5.3               MPV 9.1               Neutrophils, Abs 9.52               Neutrophils Relative 79.2               nRBC 0.0               NUCLEATED RBC ABSOLUTE 0.00               Ovalocytes Few               Phosphorus Level 3.4         2.0       PLATELET MORPHOLOGY Normal               Platelet Count 276               POC Glucose   170   162           Poly Few               Potassium 4.4         4.4       RBC 4.90               RDW 20.3               Sodium 131         134       WBC 12.00                                      Significant Imaging:  I have reviewed all pertinent imaging results/findings within the past 24 hours.

## 2024-08-09 NOTE — PROGRESS NOTES
[facility]  Critical Care Medicine  Progress Note    Patient Name: Teodoro Moran  MRN: [id number]  Admission Date: 8/8/2024  Hospital Length of Stay: 1 days  Code Status: Full Code  Attending Provider: Masood Brown MD  Primary Care Provider: No, Primary Doctor   Principal Problem: Diabetic ketoacidosis without coma associated with type 1 diabetes mellitus    Subjective:     HPI:  23-year-old female presented with nausea and vomiting negative COVID flu.  History but old stroke with contracture of the left upper extremity she denies any fever or chills been eating okay she is taking her medicines like she was supposed to .  Negative pregnancy test urine remarkable no chest pain    Hospital/ICU Course:  No notes on file    Interval History/Significant Events:  Patient without complaints    Review of Systems  Objective:     Vital Signs (Most Recent):  Temp: 98.4 °F (36.9 °C) (08/09/24 0308)  Pulse: 88 (08/09/24 0400)  Resp: 16 (08/09/24 0400)  BP: 117/60 (08/09/24 0400)  SpO2: 100 % (08/09/24 0400) Vital Signs (24h Range):  Temp:  [97.3 °F (36.3 °C)-99 °F (37.2 °C)] 98.4 °F (36.9 °C)  Pulse:  [] 88  Resp:  [12-27] 16  SpO2:  [89 %-100 %] 100 %  BP: ()/() 117/60   Weight: 60.6 kg (133 lb 9.6 oz)  Body mass index is 22.93 kg/m².      Intake/Output Summary (Last 24 hours) at 8/9/2024 0559  Last data filed at 8/9/2024 0547  Gross per 24 hour   Intake 1358.34 ml   Output --   Net 1358.34 ml          Physical Exam  Vitals reviewed.   Constitutional:       Appearance: Normal appearance.      Interventions: She is not intubated.  HENT:      Head: Normocephalic and atraumatic.      Nose: Nose normal.      Mouth/Throat:      Mouth: Mucous membranes are dry.      Pharynx: Oropharynx is clear.   Eyes:      Extraocular Movements: Extraocular movements intact.      Conjunctiva/sclera: Conjunctivae normal.      Pupils: Pupils are equal, round, and reactive to light.   Cardiovascular:       Rate and Rhythm: Normal rate.      Heart sounds: Normal heart sounds. No murmur heard.  Pulmonary:      Effort: Pulmonary effort is normal. She is not intubated.      Breath sounds: Normal breath sounds.   Abdominal:      General: Abdomen is flat. Bowel sounds are normal.      Palpations: Abdomen is soft.   Musculoskeletal:         General: Normal range of motion.      Cervical back: Normal range of motion and neck supple.      Right lower leg: No edema.      Left lower leg: No edema.   Skin:     General: Skin is warm and dry.      Capillary Refill: Capillary refill takes less than 2 seconds.   Neurological:      General: No focal deficit present.      Mental Status: She is alert and oriented to person, place, and time.   Psychiatric:         Mood and Affect: Mood normal.         Behavior: Behavior normal.            Vents:     Lines/Drains/Airways       Peripheral Intravenous Line  Duration                  Peripheral IV - Single Lumen 08/08/24 2042 20 G Anterior;Proximal;Right Forearm <1 day         Peripheral IV - Single Lumen 08/08/24 2042 20 G Right Antecubital <1 day                  Significant Labs:    CBC/Anemia Profile:  Recent Labs   Lab 08/08/24  1315 08/09/24  0419   WBC 5.78 12.00*   HGB 12.1 10.7*   HCT 38.9 35.5*   * 276   MCV 71.6* 72.4*   RDW 20.7* 20.3*        Chemistries:  Recent Labs   Lab 08/08/24  1315 08/08/24  1629 08/08/24 2046 08/09/24  0419   * 131* 134* 131*   K 4.4 5.1 4.4 4.4   CL 95* 98 106 103   CO2 21 19* 17* 17*   BUN 9 10 9 8   CREATININE 0.76 0.73 0.75 0.73   CALCIUM 8.9 8.3* 8.8 8.7   ALBUMIN 4.1  --   --   --    PROT 8.8*  --   --   --    BILITOT 0.4  --   --   --    ALKPHOS 106  --   --   --    ALT 31  --   --   --    AST 26  --   --   --    MG  --   --  1.9 1.9   PHOS  --   --  2.0* 3.4       Recent Lab Results  (Last 5 results in the past 24 hours)        08/09/24  0419   08/09/24  0256   08/08/24  2346   08/08/24  2257   08/08/24  2046        Ai, [place]          Negative       Anion Gap 15         15       Aniso 2+               Baso # 0.02               Basophil % 0.2               BUN 8         9       BUN/CREAT RATIO 11         12       Calcium 8.7         8.8       Chloride 103         106       CO2 17         17       Creatinine 0.73         0.75       Differential Method Scan Smear               eGFR 120         116       Eos # 0.01               Eos % 0.1               Estimated Avg Glucose         154       Glucose 177         141       Hematocrit 35.5               Hemoglobin 10.7               Hemoglobin A1C External         7.0  Comment:   Normal:               <5.7%  Pre-Diabetic:       5.7% to 6.4%  Diabetic:             >6.4%  Diabetic Goal:     <7%       Hypo Few               Immature Grans (Abs) 0.05               Immature Granulocytes 0.4               Lactic Acid Level       1.9   3.5  Comment: A repeat order for Lactic Acid has been placed for collection in 2 hours.       Lymph # 1.77               Lymph % 14.8               Magnesium  1.9         1.9       MCH 21.8               MCHC 30.1               MCV 72.4               Microcytosis 1+               Mono # 0.63               Mono % 5.3               MPV 9.1               Neutrophils, Abs 9.52               Neutrophils Relative 79.2               nRBC 0.0               NUCLEATED RBC ABSOLUTE 0.00               Ovalocytes Few               Phosphorus Level 3.4         2.0       PLATELET MORPHOLOGY Normal               Platelet Count 276               POC Glucose   170   162           Poly Few               Potassium 4.4         4.4       RBC 4.90               RDW 20.3               Sodium 131         134       WBC 12.00                                      Significant Imaging:  I have reviewed all pertinent imaging results/findings within the past 24 hours.    ABG  Recent Labs   Lab 08/08/24  1711   PH 7.15*   PO2 16*   PCO2 47   HCO3 16.4*     Assessment/Plan:     Neuro  Hemiparesis  affecting left side as late effect of stroke  Noted    Renal/  Hypophosphatemia  Resolved    Endocrine  * Diabetic ketoacidosis without coma associated with type 1 diabetes mellitus  This has resolved             Masood Brown MD  Critical Care Medicine  [facility]

## 2024-08-09 NOTE — PHARMACY MED REC
"Admission Medication History     The home medication history was taken by Latia Cho.    You may go to "Admission" then "Reconcile Home Medications" tabs to review and/or act upon these items.     The home medication list has been updated by the Pharmacy department.   Please read ALL comments highlighted in yellow.   Please address this information as you see fit.    Feel free to contact us if you have any questions or require assistance.  Medications added:  Semglee (insulin glargine)  Aspirin 81 mg chew tab  Amitriptyline 25 mg      The medications listed below were removed from the home medication list. Please reorder if appropriate:  Patient reports no longer taking the following medication(s):  Amitriptyline 10 mg    Medications listed below were obtained from: Analytic software- [employer], Medical records, and Patient's pharmacy  PTA Medications   Medication Sig    amitriptyline (ELAVIL) 25 MG tablet Take 25 mg by mouth every evening.    aspirin 81 MG Chew Take 1 tablet by mouth once daily.    atorvastatin (LIPITOR) 20 MG tablet Take 40 mg by mouth every evening.    baclofen (LIORESAL) 10 MG tablet Take 10 mg by mouth every evening.    dapagliflozin propanediol (FARXIGA ORAL) Take by mouth.    gabapentin (NEURONTIN) 300 MG capsule Take 300 mg by mouth every evening.    glucagon 1 mg/0.2 mL AtIn Inject 1 mg into the skin as needed (low bgc).    lisinopriL (PRINIVIL,ZESTRIL) 5 MG tablet 5 mg.    SEMGLEE,INSULIN GLARG-YFGN, unit/mL (3 mL) InPn Inject 18 Units into the skin every morning. Inject 18 units subq AM and 15 units subq PM    insulin aspart U-100 (NOVOLOG) 100 unit/mL (3 mL) InPn pen INJECT 14 UNITS WITH BREAKFAST AND LUNCH, AND 12 UNITS WITH DINNER. PLUS SLIDING SCALE: LESS THAN 200, NO EXTRA INSULIN; 201-250, 2 UNITS: 251-300, 4 UNITS: 301-350, 6 UNITS: 351-400, 8 UNITS: OVER 400, 10 UNITS. MAX DAILY DOSE 70 UNITS       Current Outpatient Medications on File Prior to Encounter   Medication " Sig Dispense Refill Last Dose    amitriptyline (ELAVIL) 25 MG tablet Take 25 mg by mouth every evening.   8/8/2024    aspirin 81 MG Chew Take 1 tablet by mouth once daily.   Past Month    atorvastatin (LIPITOR) 20 MG tablet Take 40 mg by mouth every evening.   8/8/2024    baclofen (LIORESAL) 10 MG tablet Take 10 mg by mouth every evening.   8/8/2024    dapagliflozin propanediol (FARXIGA ORAL) Take by mouth.   8/8/2024    gabapentin (NEURONTIN) 300 MG capsule Take 300 mg by mouth every evening.   8/8/2024    glucagon 1 mg/0.2 mL AtIn Inject 1 mg into the skin as needed (low bgc).       lisinopriL (PRINIVIL,ZESTRIL) 5 MG tablet 5 mg.   8/8/2024    SEMGLEE,INSULIN GLARG-YFGN, unit/mL (3 mL) InPn Inject 18 Units into the skin every morning. Inject 18 units subq AM and 15 units subq PM   8/8/2024    insulin aspart U-100 (NOVOLOG) 100 unit/mL (3 mL) InPn pen INJECT 14 UNITS WITH BREAKFAST AND LUNCH, AND 12 UNITS WITH DINNER. PLUS SLIDING SCALE: LESS THAN 200, NO EXTRA INSULIN; 201-250, 2 UNITS: 251-300, 4 UNITS: 301-350, 6 UNITS: 351-400, 8 UNITS: OVER 400, 10 UNITS. MAX DAILY DOSE 70 UNITS   More than a month    [DISCONTINUED] amitriptyline (ELAVIL) 10 MG tablet Take 10 mg by mouth every evening.       [DISCONTINUED] aspirin (ECOTRIN) 81 MG EC tablet Take 1 tablet by mouth once daily.       [DISCONTINUED] atorvastatin (LIPITOR) 40 MG tablet Take 40 mg by mouth every evening.       [DISCONTINUED] LANTUS SOLOSTAR U-100 INSULIN glargine 100 units/mL SubQ pen Inject into the skin.       [DISCONTINUED] ondansetron (ZOFRAN-ODT) 4 MG TbDL Take 1 tablet (4 mg total) by mouth every 6 (six) hours as needed (nausea). 12 tablet 0        Potential issues to be addressed PRIOR TO DISCHARGE  Please discuss with the patient barriers to adherence with medication treatment plans    Latia Cho , Pharmacy Technician-Certified (Specialist Medication History)  [id number]  .

## 2024-08-09 NOTE — ED NOTES
Insulin infusion at 0.05units/kg/hr to right 20g AC via pump at time of transfer.   D5 1/2 infusion at 125ml/hr to right 20g FA at time of transfer.     [facility] leaving facility en route to [facility] at this time.

## 2024-08-09 NOTE — NURSING
2025:  Pt arrived to ICU S 3. NADN. Sinus arrythmia per monitor without ectopy. C/o CURIEL. Dr Juares notified of arrival. Dr Juares states to continue insulin gtt at 3cc/hr (0.05/kg) until labs result and decide on further treatment. Orders placed. Pt denies nausea at this time and does not require med for insomnia. Treated for HA. X2 IV intact. IVF changed per order. TEDs placed. Mother at bedside for a visit. Password setup. Left arm contracted from stroke. Left leg brace on. CHG bath performed. Personal hygiene items provided. Refuses skid socks. Safety measures in place, call bell in hand. Will continue to monitor.

## 2024-08-09 NOTE — SUBJECTIVE & OBJECTIVE
Past Medical History:   Diagnosis Date    Arterial ischemic stroke, MCA (middle cerebral artery), right, acute     Chronic headache disorder     [facility] Dr. Chaidez    Hemiparesis affecting left side as late effect of stroke     Type 1 diabetes mellitus        Past Surgical History:   Procedure Laterality Date    CRANIOTOMY  07/2022       Review of patient's allergies indicates:  No Known Allergies    Current Facility-Administered Medications on File Prior to Encounter   Medication    [COMPLETED] lisinopriL tablet 5 mg    [COMPLETED] morphine injection 2 mg    [COMPLETED] ondansetron injection 4 mg    [COMPLETED] sodium chloride 0.9% bolus 1,000 mL 1,000 mL    [COMPLETED] sodium chloride 0.9% bolus 1,000 mL 1,000 mL    [DISCONTINUED] amLODIPine tablet 10 mg    [DISCONTINUED] dextrose 5 % and 0.45 % NaCl infusion    [DISCONTINUED] dextrose 50% injection 12.5 g    [DISCONTINUED] dextrose 50% injection 25 g    [DISCONTINUED] insulin regular in 0.9 % NaCl 100 unit/100 mL (1 unit/mL) infusion    [DISCONTINUED] sodium chloride 0.9% flush 10 mL     Current Outpatient Medications on File Prior to Encounter   Medication Sig    amitriptyline (ELAVIL) 10 MG tablet Take 10 mg by mouth every evening.    aspirin (ECOTRIN) 81 MG EC tablet Take 1 tablet by mouth once daily.    atorvastatin (LIPITOR) 40 MG tablet Take 40 mg by mouth every evening.    baclofen (LIORESAL) 10 MG tablet Take 10 mg by mouth every evening.    dapagliflozin propanediol (FARXIGA ORAL) Take by mouth.    gabapentin (NEURONTIN) 300 MG capsule Take 300 mg by mouth every evening.    insulin aspart U-100 (NOVOLOG) 100 unit/mL (3 mL) InPn pen INJECT 14 UNITS WITH BREAKFAST AND LUNCH, AND 12 UNITS WITH DINNER. PLUS SLIDING SCALE: LESS THAN 200, NO EXTRA INSULIN; 201-250, 2 UNITS: 251-300, 4 UNITS: 301-350, 6 UNITS: 351-400, 8 UNITS: OVER 400, 10 UNITS. MAX DAILY DOSE 70 UNITS    LANTUS SOLOSTAR U-100 INSULIN glargine 100 units/mL SubQ pen  Inject into the skin.    lisinopriL (PRINIVIL,ZESTRIL) 5 MG tablet 5 mg.    ondansetron (ZOFRAN-ODT) 4 MG TbDL Take 1 tablet (4 mg total) by mouth every 6 (six) hours as needed (nausea).    [DISCONTINUED] amLODIPine (NORVASC) 10 MG tablet 10 mg once daily.     Family History    None       Tobacco Use    Smoking status: Every Day     Current packs/day: 0.50     Types: Cigarettes    Smokeless tobacco: Never   Substance and Sexual Activity    Alcohol use: Never    Drug use: Yes     Frequency: 14.0 times per week     Types: Marijuana    Sexual activity: Yes     Review of Systems   Constitutional:  Negative for appetite change, chills, fatigue, fever and unexpected weight change.   HENT:  Negative for congestion, mouth sores, nosebleeds, sinus pain, sore throat and trouble swallowing.    Respiratory:  Negative for apnea, cough, chest tightness and shortness of breath.    Cardiovascular:  Negative for chest pain, palpitations and leg swelling.   Gastrointestinal:  Positive for nausea and vomiting. Negative for abdominal pain, blood in stool, constipation and diarrhea.   Genitourinary:  Negative for decreased urine volume, difficulty urinating, dysuria and frequency.   Musculoskeletal:  Negative for arthralgias, back pain and neck pain.   Skin:  Negative for rash.   Neurological:  Positive for headaches. Negative for syncope and light-headedness.   Hematological:  Does not bruise/bleed easily.   Psychiatric/Behavioral:  Negative for confusion and suicidal ideas.      Objective:     Vital Signs (Most Recent):  Temp: 97.8 °F (36.6 °C) (08/08/24 2032)  Pulse: 85 (08/08/24 2130)  Resp: 17 (08/08/24 2130)  BP: 136/73 (08/08/24 2130)  SpO2: 98 % (08/08/24 2130) Vital Signs (24h Range):  Temp:  [97.3 °F (36.3 °C)-97.8 °F (36.6 °C)] 97.8 °F (36.6 °C)  Pulse:  [66-95] 85  Resp:  [15-20] 17  SpO2:  [95 %-100 %] 98 %  BP: (135-179)/() 136/73     Weight: 60.4 kg (133 lb 2.5 oz)  Body mass index is 22.86 kg/m².     Physical  Exam  Constitutional:       Appearance: Normal appearance.   HENT:      Head: Atraumatic.      Mouth/Throat:      Mouth: Mucous membranes are dry.      Pharynx: Oropharynx is clear.   Eyes:      Conjunctiva/sclera: Conjunctivae normal.      Pupils: Pupils are equal, round, and reactive to light.   Neck:      Vascular: No carotid bruit.   Cardiovascular:      Rate and Rhythm: Normal rate and regular rhythm.      Pulses: Normal pulses.      Heart sounds: Normal heart sounds.   Pulmonary:      Effort: Pulmonary effort is normal.      Breath sounds: Normal breath sounds.   Abdominal:      General: Abdomen is flat. Bowel sounds are normal.      Palpations: Abdomen is soft.      Tenderness: There is no abdominal tenderness.   Musculoskeletal:         General: No deformity or signs of injury. Normal range of motion.      Cervical back: Neck supple.      Left lower leg: No edema.   Skin:     General: Skin is warm and dry.      Capillary Refill: Capillary refill takes less than 2 seconds.      Coloration: Skin is not jaundiced or pale.      Findings: No bruising, lesion or rash.   Neurological:      Mental Status: She is alert and oriented to person, place, and time. Mental status is at baseline.   Psychiatric:         Mood and Affect: Mood normal.              CRANIAL NERVES     CN III, IV, VI   Pupils are equal, round, and reactive to light.       Significant Labs: All pertinent labs within the past 24 hours have been reviewed.    Significant Imaging: I have reviewed all pertinent imaging results/findings within the past 24 hours.

## 2024-08-09 NOTE — PROGRESS NOTES
[facility]  Wound Care    Patient Name:  Teodoro Moran   MRN:  [id number]  Date: 8/9/2024  Diagnosis: Diabetic ketoacidosis without coma associated with type 1 diabetes mellitus    History:     Past Medical History:   Diagnosis Date    Arterial ischemic stroke, MCA (middle cerebral artery), right, acute     Chronic headache disorder     [facility] Dr. Chaidez    Hemiparesis affecting left side as late effect of stroke     Type 1 diabetes mellitus        Social History     Socioeconomic History    Marital status: Single   Tobacco Use    Smoking status: Every Day     Current packs/day: 0.50     Types: Cigarettes    Smokeless tobacco: Never   Substance and Sexual Activity    Alcohol use: Never    Drug use: Yes     Frequency: 14.0 times per week     Types: Marijuana    Sexual activity: Yes     Social Determinants of Health     Financial Resource Strain: Low Risk  (8/8/2024)    Overall Financial Resource Strain (CARDIA)     Difficulty of Paying Living Expenses: Not hard at all   Food Insecurity: No Food Insecurity (8/8/2024)    Hunger Vital Sign     Worried About Running Out of Food in the Last Year: Never true     Ran Out of Food in the Last Year: Never true   Transportation Needs: No Transportation Needs (8/8/2024)    TRANSPORTATION NEEDS     Transportation : No   Physical Activity: Inactive (7/22/2024)    Exercise Vital Sign     Days of Exercise per Week: 0 days     Minutes of Exercise per Session: 0 min   Stress: No Stress Concern Present (8/8/2024)    [employer] - Occupational Stress Questionnaire     Feeling of Stress : Not at all   Housing Stability: Low Risk  (8/8/2024)    Housing Stability Vital Sign     Unable to Pay for Housing in the Last Year: No     Homeless in the Last Year: No       Precautions:     Allergies as of 08/08/2024    (No Known Allergies)       [facility] Assessment Details/Treatment     Narrative: Seen patient for initiation  of skin care measures    Patient in bed. Family at bedside.  Patient states has no skin issues.  Bilateral heels with out pressure injury noted. Ambulates     Consult skin care for any skin issues  08/09/2024

## 2024-08-09 NOTE — PLAN OF CARE
[facility]  Initial Discharge Assessment       Primary Care Provider: No, Primary Doctor    Admission Diagnosis: DKA (diabetic ketoacidosis) [E11.10]    Admission Date: 8/8/2024  Expected Discharge Date:     Transition of Care Barriers: None    Payor: MEDICAID [place] / Plan: [place] [facility] [place] / Product Type: Managed Medicaid /     Extended Emergency Contact Information  Primary Emergency Contact: Verona Iniguez  Mobile Phone: [phone]  Relation: Mother  Secondary Emergency Contact: Ila Iniguez  Mobile Phone: [phone]  Relation: Grandparent    Discharge Plan A: Home with family  Discharge Plan B: Home with family      [facility], [place] - [address]  [address]  [place] [address]  Phone: [phone] Fax: [phone]      Initial Assessment (most recent)       Adult Discharge Assessment - 08/09/24 1212          Discharge Assessment    Assessment Type Discharge Planning Assessment     Confirmed/corrected address, phone number and insurance Yes     Confirmed Demographics Correct on Facesheet     Source of Information patient     Communicated OLU with patient/caregiver Date not available/Unable to determine     Reason For Admission DKA     People in Home parent(s)     Do you expect to return to your current living situation? Yes     Do you have help at home or someone to help you manage your care at home? Yes     Who are your caregiver(s) and their phone number(s)? Verona Iniguez (Mother)  [phone]     Prior to hospitilization cognitive status: Unable to Assess     Current cognitive status: Alert/Oriented     Walking or Climbing Stairs Difficulty yes     Walking or Climbing Stairs ambulation difficulty, assistance 1 person;ambulation difficulty, requires equipment     Mobility Management Straight cane     Dressing/Bathing Difficulty yes     Dressing/Bathing bathing difficulty, assistance 1 person     Equipment Currently Used at Home  cane, straight     Readmission within 30 days? Yes     Patient currently being followed by outpatient case management? No     Do you currently have service(s) that help you manage your care at home? No     Do you take prescription medications? Yes     Do you have prescription coverage? Yes     Coverage Medicaid     Do you have any problems affording any of your prescribed medications? No     Is the patient taking medications as prescribed? yes     Who is going to help you get home at discharge? Verona Iniguez (Mother)  [phone]     How do you get to doctors appointments? family or friend will provide     Are you on dialysis? No     Do you take coumadin? No     Discharge Plan A Home with family     Discharge Plan B Home with family     DME Needed Upon Discharge  none     Discharge Plan discussed with: Patient     Transition of Care Barriers None                   Pt admitted for DKA. Pt lives at home with mother and plans to return home on d/c SDOH completed on 7/24 and 8/24. SS following for d/c needs.

## 2024-08-10 VITALS
TEMPERATURE: 98 F | WEIGHT: 137.13 LBS | BODY MASS INDEX: 23.41 KG/M2 | HEIGHT: 64 IN | OXYGEN SATURATION: 100 % | HEART RATE: 70 BPM | DIASTOLIC BLOOD PRESSURE: 114 MMHG | SYSTOLIC BLOOD PRESSURE: 175 MMHG | RESPIRATION RATE: 14 BRPM

## 2024-08-10 LAB
ANION GAP SERPL CALCULATED.3IONS-SCNC: 7 MMOL/L (ref 7–16)
BUN SERPL-MCNC: 6 MG/DL (ref 7–18)
BUN/CREAT SERPL: 10 (ref 6–20)
CALCIUM SERPL-MCNC: 8.4 MG/DL (ref 8.5–10.1)
CHLORIDE SERPL-SCNC: 111 MMOL/L (ref 98–107)
CO2 SERPL-SCNC: 24 MMOL/L (ref 21–32)
CREAT SERPL-MCNC: 0.63 MG/DL (ref 0.55–1.02)
EGFR (NO RACE VARIABLE) (RUSH/TITUS): 130 ML/MIN/1.73M2
GLUCOSE SERPL-MCNC: 103 MG/DL (ref 74–106)
GLUCOSE SERPL-MCNC: 230 MG/DL (ref 70–105)
GLUCOSE SERPL-MCNC: 261 MG/DL (ref 70–105)
POTASSIUM SERPL-SCNC: 3.3 MMOL/L (ref 3.5–5.1)
SODIUM SERPL-SCNC: 139 MMOL/L (ref 136–145)

## 2024-08-10 PROCEDURE — 25000003 PHARM REV CODE 250: Performed by: HOSPITALIST

## 2024-08-10 PROCEDURE — 25000003 PHARM REV CODE 250: Performed by: NURSE PRACTITIONER

## 2024-08-10 PROCEDURE — 99239 HOSP IP/OBS DSCHRG MGMT >30: CPT | Mod: ,,, | Performed by: NURSE PRACTITIONER

## 2024-08-10 PROCEDURE — 82962 GLUCOSE BLOOD TEST: CPT

## 2024-08-10 PROCEDURE — 80048 BASIC METABOLIC PNL TOTAL CA: CPT | Performed by: NURSE PRACTITIONER

## 2024-08-10 PROCEDURE — 36415 COLL VENOUS BLD VENIPUNCTURE: CPT | Performed by: NURSE PRACTITIONER

## 2024-08-10 PROCEDURE — 63600175 PHARM REV CODE 636 W HCPCS: Performed by: HOSPITALIST

## 2024-08-10 RX ADMIN — POTASSIUM BICARBONATE 20 MEQ: 782 TABLET, EFFERVESCENT ORAL at 11:08

## 2024-08-10 RX ADMIN — Medication 6 MG: at 02:08

## 2024-08-10 RX ADMIN — INSULIN ASPART 4 UNITS: 100 INJECTION, SOLUTION INTRAVENOUS; SUBCUTANEOUS at 06:08

## 2024-08-10 RX ADMIN — LISINOPRIL 5 MG: 5 TABLET ORAL at 08:08

## 2024-08-10 RX ADMIN — INSULIN ASPART 6 UNITS: 100 INJECTION, SOLUTION INTRAVENOUS; SUBCUTANEOUS at 11:08

## 2024-08-10 RX ADMIN — ASPIRIN 81 MG: 81 TABLET, COATED ORAL at 08:08

## 2024-08-10 RX ADMIN — MUPIROCIN: 20 OINTMENT TOPICAL at 08:08

## 2024-08-10 NOTE — HOSPITAL COURSE
23-year-old female who was admitted for diabetic ketoacidosis.  She was recently admitted 2 weeks prior and discharged home.  Infectious workup was negative.  After discussion with her endocrinologist felt to be secondary to Farxiga that she was recently started on.  We will discontinue this medicine and she is to follow up with her endocrinologist this Tuesday.  Will discharged home today.

## 2024-08-10 NOTE — PLAN OF CARE
Problem: Adult Inpatient Plan of Care  Goal: Optimal Comfort and Wellbeing  Intervention: Provide Person-Centered Care  Flowsheets (Taken 8/9/2024 2211)  Trust Relationship/Rapport:   care explained   choices provided   emotional support provided   empathic listening provided   questions answered   thoughts/feelings acknowledged   reassurance provided   questions encouraged     Problem: Diabetes Comorbidity  Goal: Blood Glucose Level Within Targeted Range  Outcome: Progressing  Intervention: Monitor and Manage Glycemia  Flowsheets (Taken 8/9/2024 2211)  Glycemic Management: blood glucose monitored

## 2024-08-10 NOTE — DISCHARGE SUMMARY
[facility]  Critical Care Medicine  Discharge Summary      Patient Name: Teodoro Moran  MRN: [id number]  Admission Date: 8/8/2024  Hospital Length of Stay: 2 days  Discharge Date and Time:  08/10/2024 11:40 AM  Attending Physician: Masood Brown MD   Discharging [facility]  Primary Care Provider: No, Primary Doctor  Reason for Admission: DKA    HPI:   23-year-old female presented with nausea and vomiting negative COVID flu.  History but old stroke with contracture of the left upper extremity she denies any fever or chills been eating okay she is taking her medicines like she was supposed to .  Negative pregnancy test urine remarkable no chest pain    * No surgery found *    Indwelling Lines/Drains at Time of Discharge:   Lines/Drains/Airways       None                 Hospital Course:   23-year-old female who was admitted for diabetic ketoacidosis.  She was recently admitted 2 weeks prior and discharged home.  Infectious workup was negative.  After discussion with her endocrinologist felt to be secondary to Farxiga that she was recently started on.  We will discontinue this medicine and she is to follow up with her endocrinologist this Tuesday.  Will discharged home today.      Significant Labs:  All pertinent labs within the past 24 hours have been reviewed.    Significant Imaging:  I have reviewed all pertinent imaging results/findings within the past 24 hours.    Pending Diagnostic Studies:       Procedure Component Value Units Date/Time    Procalcitonin [[id number]] Collected: 08/09/24 0419    Order Status: Sent Lab Status: In process Updated: 08/09/24 0434    Specimen: Blood           Final Active Diagnoses:    Diagnosis Date Noted POA    PRINCIPAL PROBLEM:  Diabetic ketoacidosis without coma associated with type 1 diabetes mellitus [E10.10] 08/08/2024 Yes    Hypophosphatemia [E83.39] 08/09/2024 Yes    Lactic acidosis due to diabetes mellitus [E11.10]  08/08/2024 Yes    Hemiparesis affecting left side as late effect of stroke [I69.354]  Not Applicable      Problems Resolved During this Admission:     No new Assessment & Plan notes have been filed under this hospital service since the last note was generated.  Service: Critical Care Medicine    Discharged Condition: stable    Disposition: Home or Self Care    discharge time >30 minutes       Patient Instructions:   No discharge procedures on file.  Medications:  Reconciled Home Medications:      Medication List        CONTINUE taking these medications      amitriptyline 25 MG tablet  Commonly known as: ELAVIL  Take 25 mg by mouth every evening.     aspirin 81 MG Chew  Take 1 tablet by mouth once daily.     atorvastatin 20 MG tablet  Commonly known as: LIPITOR  Take 40 mg by mouth every evening.     baclofen 10 MG tablet  Commonly known as: LIORESAL  Take 10 mg by mouth every evening.     gabapentin 300 MG capsule  Commonly known as: NEURONTIN  Take 300 mg by mouth every evening.     glucagon 1 mg/0.2 mL Atin  Inject 1 mg into the skin as needed (low bgc).     insulin aspart U-100 100 unit/mL (3 mL) Inpn pen  Commonly known as: NovoLOG  INJECT 14 UNITS WITH BREAKFAST AND LUNCH, AND 12 UNITS WITH DINNER. PLUS SLIDING SCALE: LESS THAN 200, NO EXTRA INSULIN; 201-250, 2 UNITS: 251-300, 4 UNITS: 301-350, 6 UNITS: 351-400, 8 UNITS: OVER 400, 10 UNITS. MAX DAILY DOSE 70 UNITS     lisinopriL 5 MG tablet  Commonly known as: PRINIVIL,ZESTRIL  5 mg.     SEMGLEE(INSULIN GLARG-YFGN) unit/mL (3 mL) Inpn  Generic drug: insulin glargine-yfgn  Inject 18 Units into the skin every morning. Inject 18 units subq AM and 15 units subq PM            STOP taking these medications      BRIANNE Islas AG-ACNP  Critical Care Medicine  [facility]

## 2024-08-14 LAB — BACTERIA BLD CULT: NORMAL

## 2024-09-28 ENCOUNTER — HOSPITAL ENCOUNTER (EMERGENCY)
Facility: HOSPITAL | Age: 24
Discharge: HOME | End: 2024-09-28
Payer: MEDICAID

## 2024-09-28 VITALS
OXYGEN SATURATION: 100 % | HEART RATE: 88 BPM | HEIGHT: 64 IN | RESPIRATION RATE: 18 BRPM | BODY MASS INDEX: 23.22 KG/M2 | WEIGHT: 136 LBS | DIASTOLIC BLOOD PRESSURE: 79 MMHG | TEMPERATURE: 97 F | SYSTOLIC BLOOD PRESSURE: 140 MMHG

## 2024-09-28 VITALS
HEART RATE: 104 BPM | OXYGEN SATURATION: 98 % | TEMPERATURE: 98 F | SYSTOLIC BLOOD PRESSURE: 156 MMHG | DIASTOLIC BLOOD PRESSURE: 95 MMHG | RESPIRATION RATE: 18 BRPM | WEIGHT: 136 LBS | BODY MASS INDEX: 23.22 KG/M2 | HEIGHT: 64 IN

## 2024-09-28 DIAGNOSIS — R51.9 ACUTE NONINTRACTABLE HEADACHE, UNSPECIFIED HEADACHE TYPE: Primary | ICD-10-CM

## 2024-09-28 DIAGNOSIS — R03.0 SINGLE EPISODE OF ELEVATED BLOOD PRESSURE: ICD-10-CM

## 2024-09-28 DIAGNOSIS — R56.9 SEIZURE: Primary | ICD-10-CM

## 2024-09-28 LAB
ALBUMIN SERPL BCP-MCNC: 3.8 G/DL (ref 3.5–5)
ALBUMIN/GLOB SERPL: 0.8 {RATIO}
ALP SERPL-CCNC: 133 U/L (ref 52–144)
ALT SERPL W P-5'-P-CCNC: 39 U/L (ref 13–56)
AMPHET UR QL SCN: NEGATIVE
ANION GAP SERPL CALCULATED.3IONS-SCNC: 22 MMOL/L (ref 7–16)
AST SERPL W P-5'-P-CCNC: 30 U/L (ref 15–37)
BACTERIA #/AREA URNS HPF: ABNORMAL /HPF
BARBITURATES UR QL SCN: NEGATIVE
BASOPHILS # BLD AUTO: 0.01 K/UL (ref 0–0.2)
BASOPHILS NFR BLD AUTO: 0.2 % (ref 0–1)
BENZODIAZ METAB UR QL SCN: NEGATIVE
BILIRUB SERPL-MCNC: 0.3 MG/DL (ref ?–1.2)
BILIRUB UR QL STRIP: NEGATIVE
BUN SERPL-MCNC: 9 MG/DL (ref 7–18)
BUN/CREAT SERPL: 8 (ref 6–20)
CALCIUM SERPL-MCNC: 8.4 MG/DL (ref 8.5–10.1)
CHLORIDE SERPL-SCNC: 96 MMOL/L (ref 98–107)
CLARITY UR: CLEAR
CO2 SERPL-SCNC: 18 MMOL/L (ref 21–32)
COCAINE UR QL SCN: NEGATIVE
COLOR UR: YELLOW
CREAT SERPL-MCNC: 1.07 MG/DL (ref 0.55–1.02)
DIFFERENTIAL METHOD BLD: ABNORMAL
EGFR (NO RACE VARIABLE) (RUSH/TITUS): 76 ML/MIN/1.73M2
EOSINOPHIL # BLD AUTO: 0.06 K/UL (ref 0–0.5)
EOSINOPHIL NFR BLD AUTO: 1.1 % (ref 1–4)
ERYTHROCYTE [DISTWIDTH] IN BLOOD BY AUTOMATED COUNT: 19.8 % (ref 11.5–14.5)
GLOBULIN SER-MCNC: 4.5 G/DL (ref 2–4)
GLUCOSE SERPL-MCNC: 280 MG/DL (ref 70–105)
GLUCOSE SERPL-MCNC: 302 MG/DL (ref 74–106)
GLUCOSE SERPL-MCNC: 306 MG/DL (ref 70–105)
GLUCOSE UR STRIP-MCNC: 500 MG/DL
HCG UR QL IA.RAPID: NEGATIVE
HCT VFR BLD AUTO: 35.9 % (ref 38–47)
HGB BLD-MCNC: 10.9 G/DL (ref 12–16)
KETONES UR STRIP-SCNC: 80 MG/DL
LEUKOCYTE ESTERASE UR QL STRIP: NEGATIVE
LYMPHOCYTES # BLD AUTO: 1.78 K/UL (ref 1–4.8)
LYMPHOCYTES NFR BLD AUTO: 32.2 % (ref 27–41)
MCH RBC QN AUTO: 22.8 PG (ref 27–31)
MCHC RBC AUTO-ENTMCNC: 30.4 G/DL (ref 32–36)
MCV RBC AUTO: 74.9 FL (ref 80–96)
MDA UR QL SCN: NEGATIVE
METHADONE UR QL SCN: NEGATIVE
METHAMPHET UR QL SCN: NEGATIVE
MONOCYTES # BLD AUTO: 0.26 K/UL (ref 0–0.8)
MONOCYTES NFR BLD AUTO: 4.7 % (ref 2–6)
MPC BLD CALC-MCNC: 10.3 FL (ref 9.4–12.4)
NEUTROPHILS # BLD AUTO: 3.41 K/UL (ref 1.8–7.7)
NEUTROPHILS NFR BLD AUTO: 61.8 % (ref 53–65)
NITRITE UR QL STRIP: NEGATIVE
OPIATES UR QL SCN: NEGATIVE
OXYCODONE UR QL SCN: NEGATIVE
PCP UR QL SCN: NEGATIVE
PH UR STRIP: 5.5 PH UNITS
PLATELET # BLD AUTO: 231 K/UL (ref 150–400)
POTASSIUM SERPL-SCNC: 4.3 MMOL/L (ref 3.5–5.1)
PROT SERPL-MCNC: 8.3 G/DL (ref 6.4–8.2)
PROT UR QL STRIP: 100
RBC # BLD AUTO: 4.79 M/UL (ref 4.2–5.4)
RBC # UR STRIP: NEGATIVE /UL
RBC #/AREA URNS HPF: ABNORMAL /HPF
SODIUM SERPL-SCNC: 132 MMOL/L (ref 136–145)
SP GR UR STRIP: 1.02
SQUAMOUS #/AREA URNS LPF: ABNORMAL /LPF
THC UR QL SCN: POSITIVE
TRICYCLICS UR QL SCN: POSITIVE
UROBILINOGEN UR STRIP-ACNC: 0.2 MG/DL
WBC # BLD AUTO: 5.52 K/UL (ref 4.5–11)
WBC #/AREA URNS HPF: ABNORMAL /HPF

## 2024-09-28 PROCEDURE — 25000003 PHARM REV CODE 250: Performed by: NURSE PRACTITIONER

## 2024-09-28 PROCEDURE — 81025 URINE PREGNANCY TEST: CPT | Performed by: NURSE PRACTITIONER

## 2024-09-28 PROCEDURE — 82962 GLUCOSE BLOOD TEST: CPT

## 2024-09-28 PROCEDURE — 96374 THER/PROPH/DIAG INJ IV PUSH: CPT

## 2024-09-28 PROCEDURE — 80305 DRUG TEST PRSMV DIR OPT OBS: CPT | Performed by: NURSE PRACTITIONER

## 2024-09-28 PROCEDURE — 85025 COMPLETE CBC W/AUTO DIFF WBC: CPT | Performed by: NURSE PRACTITIONER

## 2024-09-28 PROCEDURE — 80053 COMPREHEN METABOLIC PANEL: CPT | Performed by: NURSE PRACTITIONER

## 2024-09-28 PROCEDURE — 81003 URINALYSIS AUTO W/O SCOPE: CPT | Performed by: NURSE PRACTITIONER

## 2024-09-28 PROCEDURE — 81001 URINALYSIS AUTO W/SCOPE: CPT | Performed by: NURSE PRACTITIONER

## 2024-09-28 PROCEDURE — 99283 EMERGENCY DEPT VISIT LOW MDM: CPT | Mod: 25

## 2024-09-28 PROCEDURE — 63600175 PHARM REV CODE 636 W HCPCS: Performed by: NURSE PRACTITIONER

## 2024-09-28 PROCEDURE — 99285 EMERGENCY DEPT VISIT HI MDM: CPT | Mod: 25

## 2024-09-28 PROCEDURE — 96375 TX/PRO/DX INJ NEW DRUG ADDON: CPT

## 2024-09-28 RX ORDER — ACETAMINOPHEN 500 MG
1000 TABLET ORAL
Status: COMPLETED | OUTPATIENT
Start: 2024-09-28 | End: 2024-09-28

## 2024-09-28 RX ORDER — LABETALOL HYDROCHLORIDE 5 MG/ML
10 INJECTION, SOLUTION INTRAVENOUS
Status: COMPLETED | OUTPATIENT
Start: 2024-09-28 | End: 2024-09-28

## 2024-09-28 RX ORDER — ONDANSETRON HYDROCHLORIDE 2 MG/ML
4 INJECTION, SOLUTION INTRAVENOUS
Status: COMPLETED | OUTPATIENT
Start: 2024-09-28 | End: 2024-09-28

## 2024-09-28 RX ORDER — LEVETIRACETAM 500 MG/5ML
500 INJECTION, SOLUTION, CONCENTRATE INTRAVENOUS
Status: COMPLETED | OUTPATIENT
Start: 2024-09-28 | End: 2024-09-28

## 2024-09-28 RX ORDER — ONDANSETRON 4 MG/1
4 TABLET, ORALLY DISINTEGRATING ORAL
Status: COMPLETED | OUTPATIENT
Start: 2024-09-28 | End: 2024-09-28

## 2024-09-28 RX ORDER — CLONIDINE HYDROCHLORIDE 0.1 MG/1
0.2 TABLET ORAL
Status: COMPLETED | OUTPATIENT
Start: 2024-09-28 | End: 2024-09-28

## 2024-09-28 RX ORDER — LISINOPRIL 5 MG/1
5 TABLET ORAL DAILY
Qty: 30 TABLET | Refills: 0 | Status: SHIPPED | OUTPATIENT
Start: 2024-09-28 | End: 2024-10-28

## 2024-09-28 RX ORDER — HYDRALAZINE HYDROCHLORIDE 20 MG/ML
10 INJECTION INTRAMUSCULAR; INTRAVENOUS
Status: DISCONTINUED | OUTPATIENT
Start: 2024-09-28 | End: 2024-09-28

## 2024-09-28 RX ORDER — HYDRALAZINE HYDROCHLORIDE 20 MG/ML
10 INJECTION INTRAMUSCULAR; INTRAVENOUS
Status: COMPLETED | OUTPATIENT
Start: 2024-09-28 | End: 2024-09-28

## 2024-09-28 RX ADMIN — ONDANSETRON 4 MG: 4 TABLET, ORALLY DISINTEGRATING ORAL at 08:09

## 2024-09-28 RX ADMIN — ONDANSETRON 4 MG: 2 INJECTION INTRAMUSCULAR; INTRAVENOUS at 02:09

## 2024-09-28 RX ADMIN — ACETAMINOPHEN 1000 MG: 500 TABLET ORAL at 03:09

## 2024-09-28 RX ADMIN — LABETALOL HYDROCHLORIDE 10 MG: 5 INJECTION, SOLUTION INTRAVENOUS at 03:09

## 2024-09-28 RX ADMIN — LEVETIRACETAM 500 MG: 100 INJECTION, SOLUTION INTRAVENOUS at 02:09

## 2024-09-28 RX ADMIN — HYDRALAZINE HYDROCHLORIDE 10 MG: 20 INJECTION INTRAMUSCULAR; INTRAVENOUS at 02:09

## 2024-09-28 RX ADMIN — CLONIDINE HYDROCHLORIDE 0.2 MG: 0.1 TABLET ORAL at 08:09

## 2024-09-28 NOTE — DISCHARGE INSTRUCTIONS
Return to previous Keppra regimen for seizure control and hold Lamictal.  Follow up with neurology on 10/01/24 as scheduled.

## 2024-09-28 NOTE — ED TRIAGE NOTES
Pt presents to the ED via POV w/ c/o having tonic clonic seizure lasting about 5 minutes immediately prior to arrival. Pt had seizure medication changed on 24 th of this month. Pt postictal on arrival.

## 2024-09-28 NOTE — ED PROVIDER NOTES
Encounter Date: 9/28/2024       History     Chief Complaint   Patient presents with    Seizures     22 y/o AAF with PMHx of HTN, T1DM,  CVA with residual left hemiparesis and absent seizures presents pov after having a tonic-clonic seizure minutes PTA to ED. Mother states she was about to give patient her medications when seizure occurred. Mom states patient diagnosed with absent seizures 2 months ago. She was originally placed on Keppra but med changed to Lamictal 4 days ago because it was thought that Keppra was causing mood swings.    The history is provided by the patient.     Review of patient's allergies indicates:  No Known Allergies  Past Medical History:   Diagnosis Date    Arterial ischemic stroke, MCA (middle cerebral artery), right, acute     Chronic headache disorder     [facility] Dr. Chaidez    Hemiparesis affecting left side as late effect of stroke     Type 1 diabetes mellitus      Past Surgical History:   Procedure Laterality Date    CRANIOTOMY  07/2022     No family history on file.  Social History     Tobacco Use    Smoking status: Every Day     Current packs/day: 0.50     Types: Cigarettes    Smokeless tobacco: Never   Substance Use Topics    Alcohol use: Never    Drug use: Yes     Frequency: 14.0 times per week     Types: Marijuana     Review of Systems    Physical Exam     Initial Vitals [09/28/24 1335]   BP Pulse Resp Temp SpO2   (!) 168/111 105 16 98 °F (36.7 °C) 100 %      MAP       --         Physical Exam    Medical Screening Exam   See Full Note    ED Course   Procedures  Labs Reviewed   COMPREHENSIVE METABOLIC PANEL - Abnormal       Result Value    Sodium 132 (*)     Potassium 4.3      Chloride 96 (*)     CO2 18 (*)     Anion Gap 22 (*)     Glucose 302 (*)     BUN 9      Creatinine 1.07 (*)     BUN/Creatinine Ratio 8      Calcium 8.4 (*)     Total Protein 8.3 (*)     Albumin 3.8      Globulin 4.5 (*)     A/G Ratio 0.8      Bilirubin, Total 0.3      Alk Phos 133       ALT 39      AST 30      eGFR 76     CBC WITH DIFFERENTIAL - Abnormal    WBC 5.52      RBC 4.79      Hemoglobin 10.9 (*)     Hematocrit 35.9 (*)     MCV 74.9 (*)     MCH 22.8 (*)     MCHC 30.4 (*)     RDW 19.8 (*)     Platelet Count 231      MPV 10.3      Neutrophils % 61.8      Lymphocytes % 32.2      Neutrophils, Abs 3.41      Lymphocytes, Absolute 1.78      Diff Type Scan Smear      Monocytes % 4.7      Eosinophils % 1.1      Basophils % 0.2      Monocytes, Absolute 0.26      Eosinophils, Absolute 0.06      Basophils, Absolute 0.01     URINALYSIS, REFLEX TO URINE CULTURE - Abnormal    Color, UA Yellow      Clarity, UA Clear      pH, UA 5.5      Leukocytes, UA Negative      Nitrites, UA Negative      Protein,  (*)     Glucose,  (*)     Ketones, UA 80 (*)     Urobilinogen, UA 0.2      Bilirubin, UA Negative      Blood, UA Negative      Specific [place], UA 1.020     DRUG SCREEN, URINE (BEAKER) - Abnormal    Barbiturates, Urine Negative      Benzodiazepine, Urine Negative      Marijuana (THC), Urine Positive (*)     MDMA (Ecstasy) Negative      Methadone Negative      Opiates, Urine Negative      Oxycodone Negative      Phencyclidine, Urine Negative      Tricyclic Antidepressants, Urine Positive (*)     Amphetamine, Urine Negative      Cocaine, Urine Negative      Methamphetamines Negative     URINALYSIS, MICROSCOPIC - Abnormal    WBC, UA 0-5      RBC, UA None Seen      Bacteria, UA Few (*)     Squamous Epithelial Cells, UA Few (*)    POCT GLUCOSE MONITORING CONTINUOUS - Abnormal    POC Glucose 280 (*)    HCG QUALITATIVE URINE - Normal    HCG Qualitative, Urine Negative     CBC W/ AUTO DIFFERENTIAL    Narrative:     The following orders were created for panel order CBC auto differential.  Procedure                               Abnormality         Status                     ---------                               -----------         ------                     CBC with Differential[[id number]]        Abnormal            Final result                 Please view results for these tests on the individual orders.   POCT GLUCOSE MONITORING CONTINUOUS          Imaging Results              CT Head Without Contrast (Final result)  Result time 09/28/24 14:52:35      Final result by Darshan Cantor MD (09/28/24 14:52:35)                   Impression:      No CT evidence of acute intracranial abnormality.    Operative changes of prior right-sided craniotomy with large region of encephalomalacia in the right cerebral hemisphere, as seen previously.      Electronically signed by: Darshan Cantor MD  Date:    09/28/2024  Time:    14:52               Narrative:    EXAMINATION:  CT HEAD WITHOUT CONTRAST    CLINICAL HISTORY:  Seizure, new-onset, no history of trauma;    TECHNIQUE:  Low dose axial images were obtained through the head.  Contrast was not administered.  Coronal and sagittal reformats were not obtained per protocol at this hospital.    COMPARISON:  08/08/2024.    FINDINGS:  Operative changes of right-sided craniotomy.  Similar large region of encephalomalacia in the right MCA distribution and right superior PATRICE distribution.  Ex vacuo dilation of the right lateral ventricle and 3rd ventricle.  Mild rightward midline shift similar to prior study, likely related to volume loss in the right cerebral hemisphere.  Brain parenchyma is similar to prior study.    No evidence of acute territorial infarct, hemorrhage, mass effect, or worsening midline shift.    Ventricles are similar in size and configuration to the prior study.    No displaced calvarial fracture.    Visualized paranasal sinuses and mastoid air cells are essentially clear.                                       Medications   levETIRAcetam injection 500 mg (500 mg Intravenous Given 9/28/24 1456)   ondansetron injection 4 mg (4 mg Intravenous Given 9/28/24 1431)   hydrALAZINE injection 10 mg (10 mg Intravenous Given 9/28/24 1456)   acetaminophen tablet 1,000  mg (1,000 mg Oral Given 9/28/24 1502)   labetaloL injection 10 mg (10 mg Intravenous Given 9/28/24 1525)     Medical Decision Making  22 y/o AAF with PMHx of HTN, T1DM,  CVA with residual left hemiparesis and absent seizures presents pov after having a tonic-clonic seizure minutes PTA to ED. Mother states she was about to give patient her medications when seizure occurred. Mom states patient diagnosed with absent seizures 2 months ago. She was originally placed on Keppra but med changed to Lamictal 4 days ago because it was thought that Keppra was causing mood swings.    Amount and/or Complexity of Data Reviewed  Labs: ordered.     Details: CBC: H/H 10.9/35.9  CMP: Sodium 132, Glucose 302  UA: Unremarkable  UPT: Negative  UDS: Marijuana Positive, Tricyclic antidepressant Positive  Radiology: ordered.     Details: CT Head: No CT evidence of acute intracranial abnormality    Risk  OTC drugs.  Prescription drug management.               ED Course as of 09/28/24 1545   Sat Sep 28, 2024   1535 Patient A,A, O x 3. States she is feel much better. Discussed discharge plan with patient and mother. Mother contacted patient.s neurologist about seizure medication. He states it is up to ED staff as to which seizure medication to discharge patient with. Plan is to discharge patient on PO Keppra until she goes to here f/u visit with her neurologist scheduled for Tuesday 10/01/24. [[place]]      ED Course User Index  [[place]] Rashaad Garcia FNP                           Clinical Impression:   Final diagnoses:  [R56.9] Seizure (Primary)        ED Disposition Condition    Discharge Stable          ED Prescriptions    None       Follow-up Information    None          Rashaad Garcia FNP  09/28/24 1541

## 2024-09-29 NOTE — ED NOTES
Multiple attempts to start IV with no success.   
Patient discharged to home via private vehicle with mother, discharge instructions given with voiced understanding. No signs of adverse reaction noted to po meds. NADN  
L foot injury while skateboarding. R/o fracture/dislocation   plan  - xr L foot/ankle  - pain control  - reassess

## 2024-09-29 NOTE — ED PROVIDER NOTES
Encounter Date: 9/28/2024       History     Chief Complaint   Patient presents with    Headache    Nausea    Vomiting     H/A started around 6:30 pm-7:00pm with N/V . Gave Ibuprofen,      24 y/o AAF with PMHx of HTN, T1DM, CVA with left hemiparesis, and seizure disorder presents pov with c/o headache and n/v with onset 1.5 hours PTA to ED. Patient was seen here earlier today due to tonic-clonic seizure after seizure medication change 4 days ago. Patient c/o a headache at that time but it resolved with Tylenol and after elevated BP returned to normal after HTN med given. CT of head was normal. Mom states she treated headache with Ibuprofen when headache initially returned. She was going to treat her elevated BP but discovered patient is out of her HTN medication. Mom states patient vomited x 1 en route to ED. Mom describes vomitus as secretions.    The history is provided by the patient.     Review of patient's allergies indicates:  No Known Allergies  Past Medical History:   Diagnosis Date    Arterial ischemic stroke, MCA (middle cerebral artery), right, acute     Chronic headache disorder     [facility] Dr. Chaidez    Hemiparesis affecting left side as late effect of stroke     Type 1 diabetes mellitus      Past Surgical History:   Procedure Laterality Date    CRANIOTOMY  07/2022     No family history on file.  Social History     Tobacco Use    Smoking status: Every Day     Current packs/day: 0.50     Types: Cigarettes    Smokeless tobacco: Never   Substance Use Topics    Alcohol use: Never    Drug use: Yes     Frequency: 14.0 times per week     Types: Marijuana     Review of Systems   Constitutional:  Negative for chills and fever.   HENT: Negative.     Eyes:  Negative for photophobia, pain, discharge, redness, itching and visual disturbance.   Respiratory: Negative.  Negative for apnea, cough, choking, chest tightness, shortness of breath, wheezing and stridor.    Cardiovascular: Negative.   Negative for chest pain, palpitations and leg swelling.   Gastrointestinal:  Positive for nausea. Negative for abdominal distention, anal bleeding, blood in stool, constipation, diarrhea, rectal pain and vomiting.   Genitourinary: Negative.    Musculoskeletal: Negative.    Skin: Negative.    Neurological:  Positive for headaches. Negative for dizziness, tremors, seizures, syncope, facial asymmetry, speech difficulty, weakness, light-headedness and numbness.   Psychiatric/Behavioral: Negative.     All other systems reviewed and are negative.      Physical Exam     Initial Vitals [09/28/24 1941]   BP Pulse Resp Temp SpO2   (!) 166/98 70 18 97.4 °F (36.3 °C) 100 %      MAP       --         Physical Exam    Nursing note and vitals reviewed.  Constitutional: She appears well-developed and well-nourished. No distress.   HENT:   Head: Normocephalic.   Right Ear: Tympanic membrane and ear canal normal.   Left Ear: Tympanic membrane and ear canal normal.   Nose: Nose normal. Right sinus exhibits no maxillary sinus tenderness and no frontal sinus tenderness. Left sinus exhibits no maxillary sinus tenderness and no frontal sinus tenderness. Mouth/Throat: Uvula is midline, oropharynx is clear and moist and mucous membranes are normal.   Eyes: Conjunctivae and EOM are normal. Pupils are equal, round, and reactive to light.   Neck: Neck supple.   Normal range of motion.  Cardiovascular:  Normal rate, regular rhythm, normal heart sounds and intact distal pulses.     Exam reveals no gallop and no friction rub.       No murmur heard.  Pulmonary/Chest: Breath sounds normal. No respiratory distress. She has no wheezes. She has no rhonchi. She has no rales. She exhibits no tenderness.   Abdominal: Abdomen is soft. Bowel sounds are normal. She exhibits no distension. There is no abdominal tenderness.   Musculoskeletal:         General: No tenderness. Normal range of motion.      Cervical back: Normal range of motion and neck supple.      Neurological: She is alert and oriented to person, place, and time. She has normal strength. She displays normal reflexes. No cranial nerve deficit or sensory deficit.   Skin: Skin is warm and dry. Capillary refill takes less than 2 seconds.   Psychiatric: She has a normal mood and affect. Her behavior is normal. Judgment and thought content normal.         Medical Screening Exam   See Full Note    ED Course   Procedures  Labs Reviewed   POCT GLUCOSE MONITORING CONTINUOUS - Abnormal       Result Value    POC Glucose 306 (*)    POCT GLUCOSE MONITORING CONTINUOUS          Imaging Results    None          Medications   cloNIDine tablet 0.2 mg (0.2 mg Oral Given 9/28/24 2038)   ondansetron disintegrating tablet 4 mg (4 mg Oral Given 9/28/24 2042)     Medical Decision Making  22 y/o AAF with PMHx of HTN, T1DM, CVA with left hemiparesis, and seizure disorder presents pov with c/o headache and n/v with onset 1.5 hours PTA to ED. Patient was seen here earlier today due to tonic-clonic seizure after seizure medication change 4 days ago. Patient c/o a headache at that time but it resolved with Tylenol and after elevated BP returned to normal after HTN med given. CT of head was normal. Mom states she treated headache with Ibuprofen when headache initially returned. She was going to treat her elevated BP but discovered patient is out of her HTN medication. Mom states patient vomited x 1 en route to ED. Mom describes vomitus as secretions.               ED Course as of 09/28/24 2129   Sat Sep 28, 2024   2113 Patient states headache and nausea have resolved. /79 [[place]]      ED Course User Index  [[place]] Rashaad Garcia FNP                           Clinical Impression:   Final diagnoses:  [R51.9] Acute nonintractable headache, unspecified headache type (Primary)  [R03.0] Single episode of elevated blood pressure        ED Disposition Condition    Discharge Stable          ED Prescriptions       Medication Sig Dispense Start  Date End Date Auth. Provider    lisinopriL (PRINIVIL,ZESTRIL) 5 MG tablet Take 1 tablet (5 mg total) by mouth once daily. 30 tablet 9/28/2024 10/28/2024 Rashaad Garcia FNP          Follow-up Information    None          Rashaad Garcia FNP  09/28/24 1351

## 2024-09-29 NOTE — ED TRIAGE NOTES
Patient arrived to Ed via private vehicle with c/o having a bad h/a with n/v that started around 6:30 pm to 7:00 Pm, stated that she was seen and treated today in this ED post seizure activity.

## 2024-12-12 PROCEDURE — 99284 EMERGENCY DEPT VISIT MOD MDM: CPT | Mod: GF,,, | Performed by: NURSE PRACTITIONER

## 2024-12-12 PROCEDURE — 99284 EMERGENCY DEPT VISIT MOD MDM: CPT | Mod: 25

## 2024-12-13 ENCOUNTER — HOSPITAL ENCOUNTER (EMERGENCY)
Facility: HOSPITAL | Age: 24
Discharge: HOME | End: 2024-12-13
Payer: MEDICAID

## 2024-12-13 VITALS
HEART RATE: 67 BPM | TEMPERATURE: 99 F | HEIGHT: 64 IN | WEIGHT: 124.13 LBS | RESPIRATION RATE: 16 BRPM | BODY MASS INDEX: 21.19 KG/M2 | OXYGEN SATURATION: 97 % | DIASTOLIC BLOOD PRESSURE: 89 MMHG | SYSTOLIC BLOOD PRESSURE: 154 MMHG

## 2024-12-13 DIAGNOSIS — R10.9 ABDOMINAL PAIN, UNSPECIFIED ABDOMINAL LOCATION: ICD-10-CM

## 2024-12-13 DIAGNOSIS — R11.2 NAUSEA AND VOMITING, UNSPECIFIED VOMITING TYPE: Primary | ICD-10-CM

## 2024-12-13 LAB
ALBUMIN SERPL BCP-MCNC: 4.1 G/DL (ref 3.5–5)
ALBUMIN/GLOB SERPL: 0.9 {RATIO}
ALP SERPL-CCNC: 125 U/L (ref 40–150)
ALT SERPL W P-5'-P-CCNC: 16 U/L
ANION GAP SERPL CALCULATED.3IONS-SCNC: 19 MMOL/L (ref 7–16)
AST SERPL W P-5'-P-CCNC: 28 U/L (ref 5–34)
BASOPHILS # BLD AUTO: 0.01 K/UL (ref 0–0.2)
BASOPHILS NFR BLD AUTO: 0.2 % (ref 0–1)
BILIRUB SERPL-MCNC: 0.5 MG/DL
BUN SERPL-MCNC: 8 MG/DL (ref 7–19)
BUN/CREAT SERPL: 8 (ref 6–20)
CALCIUM SERPL-MCNC: 9.8 MG/DL (ref 8.4–10.2)
CHLORIDE SERPL-SCNC: 99 MMOL/L (ref 98–107)
CO2 SERPL-SCNC: 22 MMOL/L (ref 22–29)
CREAT SERPL-MCNC: 1.01 MG/DL (ref 0.55–1.02)
DIFFERENTIAL METHOD BLD: ABNORMAL
EGFR (NO RACE VARIABLE) (RUSH/TITUS): 81 ML/MIN/1.73M2
EOSINOPHIL # BLD AUTO: 0.05 K/UL (ref 0–0.5)
EOSINOPHIL NFR BLD AUTO: 0.9 % (ref 1–4)
ERYTHROCYTE [DISTWIDTH] IN BLOOD BY AUTOMATED COUNT: 17.9 % (ref 11.5–14.5)
GLOBULIN SER-MCNC: 4.5 G/DL (ref 2–4)
GLUCOSE SERPL-MCNC: 280 MG/DL (ref 70–105)
GLUCOSE SERPL-MCNC: 335 MG/DL (ref 74–100)
HCO3 UR-SCNC: 27.1 MMOL/L (ref 24–28)
HCT VFR BLD AUTO: 37.5 % (ref 38–47)
HGB BLD-MCNC: 12.1 G/DL (ref 12–16)
LYMPHOCYTES # BLD AUTO: 1.31 K/UL (ref 1–4.8)
LYMPHOCYTES NFR BLD AUTO: 22.9 % (ref 27–41)
MCH RBC QN AUTO: 23.1 PG (ref 27–31)
MCHC RBC AUTO-ENTMCNC: 32.3 G/DL (ref 32–36)
MCV RBC AUTO: 71.7 FL (ref 80–96)
MONOCYTES # BLD AUTO: 0.3 K/UL (ref 0–0.8)
MONOCYTES NFR BLD AUTO: 5.3 % (ref 2–6)
MPC BLD CALC-MCNC: 10 FL (ref 9.4–12.4)
NEUTROPHILS # BLD AUTO: 4.04 K/UL (ref 1.8–7.7)
NEUTROPHILS NFR BLD AUTO: 70.7 % (ref 53–65)
PCO2 BLDA: 48 MMHG (ref 41–51)
PH SMN: 7.36 [PH] (ref 7.32–7.42)
PLATELET # BLD AUTO: 204 K/UL (ref 150–400)
PO2 BLDA: 20 MMHG (ref 25–40)
POC BASE EXCESS: 1.1 MMOL/L (ref -2–3)
POC SATURATED O2: 29 %
POTASSIUM SERPL-SCNC: 4.4 MMOL/L (ref 3.5–5.1)
PROT SERPL-MCNC: 8.6 G/DL (ref 6.4–8.3)
RBC # BLD AUTO: 5.23 M/UL (ref 4.2–5.4)
SODIUM SERPL-SCNC: 136 MMOL/L (ref 136–145)
WBC # BLD AUTO: 5.71 K/UL (ref 4.5–11)

## 2024-12-13 PROCEDURE — 96361 HYDRATE IV INFUSION ADD-ON: CPT

## 2024-12-13 PROCEDURE — 85025 COMPLETE CBC W/AUTO DIFF WBC: CPT | Performed by: NURSE PRACTITIONER

## 2024-12-13 PROCEDURE — 80053 COMPREHEN METABOLIC PANEL: CPT | Performed by: NURSE PRACTITIONER

## 2024-12-13 PROCEDURE — 25000003 PHARM REV CODE 250: Performed by: NURSE PRACTITIONER

## 2024-12-13 PROCEDURE — 96375 TX/PRO/DX INJ NEW DRUG ADDON: CPT

## 2024-12-13 PROCEDURE — 82962 GLUCOSE BLOOD TEST: CPT

## 2024-12-13 PROCEDURE — 96365 THER/PROPH/DIAG IV INF INIT: CPT

## 2024-12-13 PROCEDURE — 82803 BLOOD GASES ANY COMBINATION: CPT

## 2024-12-13 PROCEDURE — 63600175 PHARM REV CODE 636 W HCPCS: Performed by: NURSE PRACTITIONER

## 2024-12-13 PROCEDURE — 96372 THER/PROPH/DIAG INJ SC/IM: CPT | Performed by: NURSE PRACTITIONER

## 2024-12-13 RX ORDER — PROCHLORPERAZINE EDISYLATE 5 MG/ML
10 INJECTION INTRAMUSCULAR; INTRAVENOUS
Status: COMPLETED | OUTPATIENT
Start: 2024-12-13 | End: 2024-12-13

## 2024-12-13 RX ORDER — PROMETHAZINE HYDROCHLORIDE 25 MG/ML
25 INJECTION, SOLUTION INTRAMUSCULAR; INTRAVENOUS
Status: COMPLETED | OUTPATIENT
Start: 2024-12-13 | End: 2024-12-13

## 2024-12-13 RX ORDER — HYDROMORPHONE HYDROCHLORIDE 1 MG/ML
1 INJECTION, SOLUTION INTRAMUSCULAR; INTRAVENOUS; SUBCUTANEOUS
Status: COMPLETED | OUTPATIENT
Start: 2024-12-13 | End: 2024-12-13

## 2024-12-13 RX ADMIN — SODIUM CHLORIDE 1000 ML: 9 INJECTION, SOLUTION INTRAVENOUS at 01:12

## 2024-12-13 RX ADMIN — PROMETHAZINE HYDROCHLORIDE 25 MG: 25 INJECTION INTRAMUSCULAR; INTRAVENOUS at 01:12

## 2024-12-13 RX ADMIN — PROCHLORPERAZINE EDISYLATE 10 MG: 5 INJECTION INTRAMUSCULAR; INTRAVENOUS at 01:12

## 2024-12-13 RX ADMIN — PROMETHAZINE HYDROCHLORIDE 25 MG: 25 INJECTION INTRAMUSCULAR; INTRAVENOUS at 12:12

## 2024-12-13 RX ADMIN — HYDROMORPHONE HYDROCHLORIDE 1 MG: 1 INJECTION, SOLUTION INTRAMUSCULAR; INTRAVENOUS; SUBCUTANEOUS at 12:12

## 2024-12-13 NOTE — ED PROVIDER NOTES
Encounter Date: 12/12/2024       History     Chief Complaint   Patient presents with    Vomiting     Vomiting started last night when period started. According to mom, pt has been to several ERs and had several referrals. Pt given phenergan supp this evening at 2030 along with bentyl.   Pt dry heaving in rm.       25 y/o AAF with PMHx of CVA s/p craniotomy with residual deficits, T1DM, and chronic headaches presents pov per mother with c/o n/v and abdominal pain with onset 10 hours PTA to ED. Locates abd pain in epigastric region and rates pain 7.5/10. Mom treated with PO Zofran at onset of symptoms but patient unable to keep medication down. She then treated with phenergan suppository 2 hours after onset with no relief of symptoms. Mom then repeated phenergan suppository along with PO Bentyl one and a half hours PTA to ED. Patient has had similar episodes requiring ED visits and various specialty consults the past 5 months. Mom states symptoms usually began with start of menstruation. Patient's cycle began yesterday. Patient has also been hospitalized twice with DKA the past 5 months. Mom states the two episodes of DKA occurred after patient was placed on Farxiga. Her Farxiga was discontinued 3 months ago and patient has had no other issues with DKA according to mom.    The history is provided by the patient and a relative.     Review of patient's allergies indicates:  No Known Allergies  Past Medical History:   Diagnosis Date    Arterial ischemic stroke, MCA (middle cerebral artery), right, acute     Chronic headache disorder     [facility] Dr. Chaidez    Hemiparesis affecting left side as late effect of stroke     Type 1 diabetes mellitus      Past Surgical History:   Procedure Laterality Date    CRANIOTOMY  07/2022     No family history on file.  Social History     Tobacco Use    Smoking status: Every Day     Current packs/day: 0.50     Types: Cigarettes    Smokeless tobacco: Never    Substance Use Topics    Alcohol use: Never    Drug use: Yes     Frequency: 14.0 times per week     Types: Marijuana     Review of Systems   Constitutional:  Negative for chills and fever.   Respiratory: Negative.  Negative for apnea, cough, choking, chest tightness, shortness of breath, wheezing and stridor.    Cardiovascular: Negative.  Negative for chest pain, palpitations and leg swelling.   Gastrointestinal:  Positive for abdominal pain, nausea and vomiting. Negative for abdominal distention, anal bleeding, blood in stool, constipation, diarrhea and rectal pain.   Genitourinary: Negative.    Musculoskeletal: Negative.    Skin: Negative.    Neurological: Negative.    Psychiatric/Behavioral: Negative.     All other systems reviewed and are negative.      Physical Exam     Initial Vitals [12/13/24 0007]   BP Pulse Resp Temp SpO2   (!) 166/104 69 16 98.5 °F (36.9 °C) 98 %      MAP       --         Physical Exam    Nursing note and vitals reviewed.  Constitutional: She appears well-developed and well-nourished. No distress.   HENT:   Head: Normocephalic.   Eyes: Conjunctivae and EOM are normal. No scleral icterus.   Cardiovascular:  Normal rate, regular rhythm, normal heart sounds and intact distal pulses.     Exam reveals no gallop and no friction rub.       No murmur heard.  Pulmonary/Chest: Breath sounds normal. No respiratory distress. She has no wheezes. She has no rhonchi. She has no rales. She exhibits no tenderness.   Abdominal: Abdomen is soft. Bowel sounds are normal. She exhibits no distension and no mass. There is no abdominal tenderness. There is no rebound and no guarding.   Musculoskeletal:         General: No tenderness. Normal range of motion.     Neurological: She is alert and oriented to person, place, and time. She has normal strength. She displays normal reflexes. No cranial nerve deficit or sensory deficit.   Skin: Skin is warm and dry. Capillary refill takes less than 2 seconds.    Psychiatric: She has a normal mood and affect. Her behavior is normal. Judgment and thought content normal.         Medical Screening Exam   See Full Note    ED Course   Procedures  Labs Reviewed   COMPREHENSIVE METABOLIC PANEL - Abnormal       Result Value    Sodium 136      Potassium 4.4      Chloride 99      CO2 22      Anion Gap 19 (*)     Glucose 335 (*)     BUN 8      Creatinine 1.01      BUN/Creatinine Ratio 8      Calcium 9.8      Total Protein 8.6 (*)     Albumin 4.1      Globulin 4.5 (*)     A/G Ratio 0.9      Bilirubin, Total 0.5      Alk Phos 125      ALT 16      AST 28      eGFR 81     CBC WITH DIFFERENTIAL - Abnormal    WBC 5.71      RBC 5.23      Hemoglobin 12.1      Hematocrit 37.5 (*)     MCV 71.7 (*)     MCH 23.1 (*)     MCHC 32.3      RDW 17.9 (*)     Platelet Count 204      MPV 10.0      Neutrophils % 70.7 (*)     Lymphocytes % 22.9 (*)     Neutrophils, Abs 4.04      Lymphocytes, Absolute 1.31      Diff Type Auto      Monocytes % 5.3      Eosinophils % 0.9 (*)     Basophils % 0.2      Monocytes, Absolute 0.30      Eosinophils, Absolute 0.05      Basophils, Absolute 0.01     CBC W/ AUTO DIFFERENTIAL    Narrative:     The following orders were created for panel order CBC auto differential.  Procedure                               Abnormality         Status                     ---------                               -----------         ------                     CBC with Differential[[id number]]       Abnormal            Final result                 Please view results for these tests on the individual orders.   POCT GLUCOSE MONITORING CONTINUOUS          Imaging Results    None          Medications   sodium chloride 0.9% bolus 1,000 mL 1,000 mL (1,000 mLs Intravenous New Bag 12/13/24 0118)   promethazine (PHENERGAN) 25 mg in 0.9% NaCl 50 mL IVPB (0 mg Intravenous Stopped 12/13/24 0052)   HYDROmorphone injection 1 mg (1 mg Intravenous Given 12/13/24 0022)   prochlorperazine injection Soln 10 mg  (10 mg Intravenous Given 12/13/24 0113)   promethazine injection 25 mg (25 mg Intramuscular Given 12/13/24 0156)     Medical Decision Making  25 y/o AAF with PMHx of CVA s/p craniotomy with residual deficits, T1DM, and chronic headaches presents pov per mother with c/o n/v and abdominal pain with onset 10 hours PTA to ED. Locates abd pain in epigastric region and rates pain 7.5/10. Mom treated with PO Zofran at onset of symptoms but patient unable to keep medication down. She then treated with phenergan suppository 2 hours after onset with no relief of symptoms. Mom then repeated phenergan suppository along with PO Bentyl one and a half hours PTA to ED. Patient has had similar episodes requiring ED visits and various specialty consults the past 5 months. Mom states symptoms usually began with start of menstruation. Patient's cycle began yesterday. Patient has also been hospitalized twice with DKA the past 5 months. Mom states the two episodes of DKA occurred after patient was placed on Farxiga. Her Farxiga was discontinued 3 months ago and patient has had no other issues with DKA according to mom.    Amount and/or Complexity of Data Reviewed  Labs: ordered.     Details: CBC: Unremarkable  CMP: Anion Gap 19, Glucose 335  Venous Blood Gas: pH 7.36, PCo2 48, PO2 20, HCO3 27.1, Base excess 1.1    Risk  Prescription drug management.               ED Course as of 12/13/24 0203   Fri Dec 13, 2024   0005 Phenergan 25 mg IV given [NJ]   0020 Patient rates abd pain 8/10. Dilaudid 1 mg IV given. [NJ]   0035 Patient states nausea improved and abd pain resolved. [NJ]   0110 NS 1000cc bolus initiated. [NJ]      ED Course User Index  [NJ] Rashaad Garcia FNP                             Clinical Impression:   Final diagnoses:  [R11.2] Nausea and vomiting, unspecified vomiting type (Primary)  [R10.9] Abdominal pain, unspecified abdominal location        ED Disposition Condition    Discharge Stable          ED Prescriptions     None       Follow-up Information    None          Rashaad Garcia, CRIS  12/13/24 0203       Rashaad Garcia FNP  12/13/24 0203

## 2025-01-06 ENCOUNTER — HOSPITAL ENCOUNTER (EMERGENCY)
Facility: HOSPITAL | Age: 25
Discharge: HOME | End: 2025-01-06
Payer: MEDICAID

## 2025-01-06 VITALS
TEMPERATURE: 98 F | RESPIRATION RATE: 18 BRPM | HEIGHT: 65 IN | OXYGEN SATURATION: 99 % | BODY MASS INDEX: 19.66 KG/M2 | WEIGHT: 118 LBS | HEART RATE: 93 BPM | SYSTOLIC BLOOD PRESSURE: 178 MMHG | DIASTOLIC BLOOD PRESSURE: 84 MMHG

## 2025-01-06 DIAGNOSIS — I10 HYPERTENSION, UNSPECIFIED TYPE: ICD-10-CM

## 2025-01-06 DIAGNOSIS — R51.9 ACUTE NONINTRACTABLE HEADACHE, UNSPECIFIED HEADACHE TYPE: Primary | ICD-10-CM

## 2025-01-06 LAB
ALBUMIN SERPL BCP-MCNC: 4.3 G/DL (ref 3.5–5)
ALBUMIN/GLOB SERPL: 0.8 {RATIO}
ALP SERPL-CCNC: 130 U/L (ref 40–150)
ALT SERPL W P-5'-P-CCNC: 31 U/L
ANION GAP SERPL CALCULATED.3IONS-SCNC: 20 MMOL/L (ref 7–16)
AST SERPL W P-5'-P-CCNC: 45 U/L (ref 5–34)
BASOPHILS # BLD AUTO: 0.02 K/UL (ref 0–0.2)
BASOPHILS NFR BLD AUTO: 0.2 % (ref 0–1)
BILIRUB SERPL-MCNC: 0.3 MG/DL
BUN SERPL-MCNC: 14 MG/DL (ref 7–19)
BUN/CREAT SERPL: 13 (ref 6–20)
CALCIUM SERPL-MCNC: 9.5 MG/DL (ref 8.4–10.2)
CHLORIDE SERPL-SCNC: 101 MMOL/L (ref 98–107)
CO2 SERPL-SCNC: 15 MMOL/L (ref 22–29)
CREAT SERPL-MCNC: 1.09 MG/DL (ref 0.55–1.02)
DIFFERENTIAL METHOD BLD: ABNORMAL
EGFR (NO RACE VARIABLE) (RUSH/TITUS): 74 ML/MIN/1.73M2
EOSINOPHIL # BLD AUTO: 0.24 K/UL (ref 0–0.5)
EOSINOPHIL NFR BLD AUTO: 2.9 % (ref 1–4)
ERYTHROCYTE [DISTWIDTH] IN BLOOD BY AUTOMATED COUNT: 19.6 % (ref 11.5–14.5)
GLOBULIN SER-MCNC: 5.2 G/DL (ref 2–4)
GLUCOSE SERPL-MCNC: 270 MG/DL (ref 74–100)
HCT VFR BLD AUTO: 43.2 % (ref 38–47)
HGB BLD-MCNC: 13.5 G/DL (ref 12–16)
LYMPHOCYTES # BLD AUTO: 4.8 K/UL (ref 1–4.8)
LYMPHOCYTES NFR BLD AUTO: 58.8 % (ref 27–41)
LYMPHOCYTES NFR BLD MANUAL: 57 % (ref 27–41)
MCH RBC QN AUTO: 22.7 PG (ref 27–31)
MCHC RBC AUTO-ENTMCNC: 31.3 G/DL (ref 32–36)
MCV RBC AUTO: 72.7 FL (ref 80–96)
MONOCYTES # BLD AUTO: 0.57 K/UL (ref 0–0.8)
MONOCYTES NFR BLD AUTO: 7 % (ref 2–6)
MONOCYTES NFR BLD MANUAL: 8 % (ref 2–6)
MPC BLD CALC-MCNC: 10 FL (ref 9.4–12.4)
NEUTROPHILS # BLD AUTO: 2.53 K/UL (ref 1.8–7.7)
NEUTROPHILS NFR BLD AUTO: 31.1 % (ref 53–65)
NEUTS BAND NFR BLD MANUAL: 1 % (ref 1–5)
NEUTS SEG NFR BLD MANUAL: 34 % (ref 50–62)
PLATELET # BLD AUTO: 178 K/UL (ref 150–400)
PLATELET MORPHOLOGY: NORMAL
POTASSIUM SERPL-SCNC: 5.3 MMOL/L (ref 3.5–5.1)
PROT SERPL-MCNC: 9.5 G/DL (ref 6.4–8.3)
RBC # BLD AUTO: 5.94 M/UL (ref 4.2–5.4)
RBC MORPH BLD: NORMAL
SODIUM SERPL-SCNC: 131 MMOL/L (ref 136–145)
WBC # BLD AUTO: 8.16 K/UL (ref 4.5–11)

## 2025-01-06 PROCEDURE — 25000003 PHARM REV CODE 250: Performed by: NURSE PRACTITIONER

## 2025-01-06 PROCEDURE — 99284 EMERGENCY DEPT VISIT MOD MDM: CPT | Mod: GF,,, | Performed by: NURSE PRACTITIONER

## 2025-01-06 PROCEDURE — 96374 THER/PROPH/DIAG INJ IV PUSH: CPT

## 2025-01-06 PROCEDURE — 96375 TX/PRO/DX INJ NEW DRUG ADDON: CPT

## 2025-01-06 PROCEDURE — 80053 COMPREHEN METABOLIC PANEL: CPT | Performed by: NURSE PRACTITIONER

## 2025-01-06 PROCEDURE — 63600175 PHARM REV CODE 636 W HCPCS: Performed by: NURSE PRACTITIONER

## 2025-01-06 PROCEDURE — 99285 EMERGENCY DEPT VISIT HI MDM: CPT | Mod: 25

## 2025-01-06 PROCEDURE — 85025 COMPLETE CBC W/AUTO DIFF WBC: CPT | Performed by: NURSE PRACTITIONER

## 2025-01-06 RX ORDER — ACETAMINOPHEN 325 MG/1
650 TABLET ORAL
Status: COMPLETED | OUTPATIENT
Start: 2025-01-06 | End: 2025-01-06

## 2025-01-06 RX ORDER — ONDANSETRON HYDROCHLORIDE 2 MG/ML
4 INJECTION, SOLUTION INTRAVENOUS
Status: DISCONTINUED | OUTPATIENT
Start: 2025-01-06 | End: 2025-01-06

## 2025-01-06 RX ORDER — PROCHLORPERAZINE EDISYLATE 5 MG/ML
5 INJECTION INTRAMUSCULAR; INTRAVENOUS
Status: COMPLETED | OUTPATIENT
Start: 2025-01-06 | End: 2025-01-06

## 2025-01-06 RX ORDER — HYDRALAZINE HYDROCHLORIDE 20 MG/ML
10 INJECTION INTRAMUSCULAR; INTRAVENOUS
Status: COMPLETED | OUTPATIENT
Start: 2025-01-06 | End: 2025-01-06

## 2025-01-06 RX ADMIN — PROCHLORPERAZINE EDISYLATE 5 MG: 5 INJECTION INTRAMUSCULAR; INTRAVENOUS at 04:01

## 2025-01-06 RX ADMIN — HYDRALAZINE HYDROCHLORIDE 10 MG: 20 INJECTION INTRAMUSCULAR; INTRAVENOUS at 04:01

## 2025-01-06 RX ADMIN — ACETAMINOPHEN 650 MG: 325 TABLET ORAL at 05:01

## 2025-01-06 NOTE — ED TRIAGE NOTES
Patient arrived via private vehicle with mother with c/o waking up with severe h/a. Mother stated that her bp machine would not take bp.

## 2025-01-06 NOTE — DISCHARGE INSTRUCTIONS
Continue BP medications as directed.  Don't miss any doses.  See your primary care provider in 1-2 days to discuss elevated blood pressure and recurrent elevated blood sugar for possible medication adjustment.  Return for worsening condition or emergency needs.

## 2025-01-06 NOTE — ED PROVIDER NOTES
Encounter Date: 1/6/2025       History     Chief Complaint   Patient presents with    Headache     Woke up with bad h/a     24 yr old AAF with PMH of T1DM, HTN, CVA - craniotomy with residual left side weakness to ED with mother who reports she woke this morning with severe headache.  Mother reports she was concerned that her blood pressure was high so she gave this mornings dose of lisinopril but is unsure if she kept it down since she vomited right after taking.  Pt states no further nausea but reports headache 5/10        The history is provided by the patient.   Headache   This is a recurrent problem. The current episode started today. The pain is located in the Bilateral region. The pain does not radiate. The pain quality is similar to prior headaches. The quality of the pain is described as throbbing. Associated symptoms include nausea and vomiting. She has tried nothing for the symptoms.     Review of patient's allergies indicates:  No Known Allergies  Past Medical History:   Diagnosis Date    Arterial ischemic stroke, MCA (middle cerebral artery), right, acute     Chronic headache disorder     [facility] Dr. Chaidez    Hemiparesis affecting left side as late effect of stroke     Type 1 diabetes mellitus      Past Surgical History:   Procedure Laterality Date    CRANIOTOMY  07/2022     No family history on file.  Social History     Tobacco Use    Smoking status: Every Day     Current packs/day: 0.50     Types: Cigarettes    Smokeless tobacco: Never   Substance Use Topics    Alcohol use: Never    Drug use: Yes     Frequency: 14.0 times per week     Types: Marijuana     Review of Systems   Constitutional: Negative.    HENT: Negative.     Respiratory: Negative.     Cardiovascular: Negative.    Gastrointestinal:  Positive for nausea and vomiting.   Skin: Negative.    Neurological:  Positive for headaches.       Physical Exam     Initial Vitals [01/06/25 0427]   BP Pulse Resp Temp SpO2   (!)  203/128 69 18 97.5 °F (36.4 °C) 100 %      MAP       --         Physical Exam    Nursing note and vitals reviewed.  Neck: Neck supple.   Cardiovascular:  Normal rate and regular rhythm.           Pulmonary/Chest: Breath sounds normal.   Abdominal: Abdomen is soft. There is no abdominal tenderness.   Musculoskeletal:      Cervical back: Neck supple.     Neurological: She is alert and oriented to person, place, and time.   Left side weakness - residual from previous CVA   Skin: Skin is warm and dry.   Psychiatric: She has a normal mood and affect.         Medical Screening Exam   See Full Note    ED Course   Procedures  Labs Reviewed   COMPREHENSIVE METABOLIC PANEL - Abnormal       Result Value    Sodium 131 (*)     Potassium 5.3 (*)     Chloride 101      CO2 15 (*)     Anion Gap 20 (*)     Glucose 270 (*)     BUN 14      Creatinine 1.09 (*)     BUN/Creatinine Ratio 13      Calcium 9.5      Total Protein 9.5 (*)     Albumin 4.3      Globulin 5.2 (*)     A/G Ratio 0.8      Bilirubin, Total 0.3      Alk Phos 130      ALT 31      AST 45 (*)     eGFR 74     CBC WITH DIFFERENTIAL - Abnormal    WBC 8.16      RBC 5.94 (*)     Hemoglobin 13.5      Hematocrit 43.2      MCV 72.7 (*)     MCH 22.7 (*)     MCHC 31.3 (*)     RDW 19.6 (*)     Platelet Count 178      MPV 10.0      Neutrophils % 31.1 (*)     Lymphocytes % 58.8 (*)     Neutrophils, Abs 2.53      Lymphocytes, Absolute 4.80      Diff Type Manual      Monocytes % 7.0 (*)     Eosinophils % 2.9      Basophils % 0.2      Monocytes, Absolute 0.57      Eosinophils, Absolute 0.24      Basophils, Absolute 0.02     MANUAL DIFFERENTIAL - Abnormal    Segmented Neutrophils, Man % 34 (*)     Bands, Man % 1      Lymphocytes, Man % 57 (*)     Monocytes, Man % 8 (*)     Platelet Morphology Normal      RBC Morphology Normal     CBC W/ AUTO DIFFERENTIAL    Narrative:     The following orders were created for panel order CBC Auto Differential.  Procedure                                Abnormality         Status                     ---------                               -----------         ------                     CBC with Differential[[id number]]       Abnormal            Final result               Manual Differential[[id number]]         Abnormal            Final result                 Please view results for these tests on the individual orders.          Imaging Results              CT Head Without Contrast (In process)  Result time 01/06/25 04:38:55                     Medications   hydrALAZINE injection 10 mg (10 mg Intravenous Given 1/6/25 0440)   acetaminophen tablet 650 mg (650 mg Oral Given 1/6/25 0525)   prochlorperazine injection Soln 5 mg (5 mg Intravenous Given 1/6/25 0448)     Medical Decision Making  24 yr old AAF with PMH of T1DM, HTN, CVA - craniotomy with residual left side weakness to ED with mother who reports she woke this morning with severe headache.  Mother reports she was concerned that her blood pressure was high so she gave this mornings dose of lisinopril but is unsure if she kept it down since she vomited right after taking.  Pt states no further nausea but reports headache 5/10.  Pt has hx of headaches but mother concerned due to hx of CVA.    Symptoms resolved with IV hydralazine, compazine and tylenol.  Pt request DC home.  Mother agrees to f/u with PCP today or tomorrow to discuss medications.      Amount and/or Complexity of Data Reviewed  Labs: ordered.     Details: No acute findings.   Radiology: ordered.     Details: No acute findings    Risk  OTC drugs.  Prescription drug management.               ED Course as of 01/06/25 0609   Mon Jan 06, 2025   0445 Pt now with nausea and vomiting, will treat with compazine [CG]   0452 To CT via stretcher   [CG]   0510 Return to ED from CT, states nausea has improved.   [CG]   0531 Pt states feels much better.  Denies headache.  Denies nausea.   BP has improved.  Will continue to monitor.  Discussed with mother that  medication may need to be adjusted and encouraged them to f/u with PCP.  Pt and mother agreed.  [CG]   0605 Reports is feeling better.  Denies headache or nausea.  BP has improved.  States ready to go home.  Will prepare for DC [CG]      ED Course User Index  [CG] Aminta Rene FNP                           Clinical Impression:   Final diagnoses:  [R51.9] Acute nonintractable headache, unspecified headache type (Primary)  [I10] Hypertension, unspecified type        ED Disposition Condition    Discharge Stable          ED Prescriptions    None       Follow-up Information       Follow up With Specialties Details Why Contact Info    Tomas Ch, DO Family Medicine Go in 1 day  [address]  [place] [address]  [phone]               Aminta Rene FNP  01/06/25 0609

## 2025-01-06 NOTE — ED NOTES
Patient discharged to home via private vehicle with mother, no signs of adverse reaction noted to IV meds, discharge instructions given with voiced understanding, RAJESH

## 2025-01-09 ENCOUNTER — PATIENT OUTREACH (OUTPATIENT)
Dept: EMERGENCY MEDICINE | Facility: HOSPITAL | Age: 25
End: 2025-01-09
Payer: MEDICAID

## 2025-01-09 NOTE — PROGRESS NOTES
"I left a message for the patient to return my call.  1/9/25  Hi you have reached Verona Iniguez please leave a brief message with your name and number and I will return your call, after the tone please leave a message." Left message to return call at [phone]  Dinorah Pacheco LPN-ED Navigator  Ph#[phone]     1/10/25  ED Navigator 2nd attempt to contact patient Telephone Message Hi you have reached Verona Iniguez please leave a brief message with your name and number and I will return your call, after the tone please leave a message." Left message to return call. Patient chart will be closed  Dinorah Pacheco LPN-ED Navigator  Ph#[phone]     1/10/25  Ed navigator return call Patient mother Verona Iniguez returned call and declined services at this time. Patient chart will be closed   Dinorah Pacheco LPN-ED Navigator  Ph#[phone]   "

## 2025-01-31 ENCOUNTER — HOSPITAL ENCOUNTER (EMERGENCY)
Facility: HOSPITAL | Age: 25
Discharge: HOME | End: 2025-01-31
Payer: MEDICAID

## 2025-01-31 VITALS
DIASTOLIC BLOOD PRESSURE: 105 MMHG | BODY MASS INDEX: 20.49 KG/M2 | RESPIRATION RATE: 16 BRPM | WEIGHT: 120 LBS | HEART RATE: 78 BPM | TEMPERATURE: 99 F | HEIGHT: 64 IN | OXYGEN SATURATION: 97 % | SYSTOLIC BLOOD PRESSURE: 166 MMHG

## 2025-01-31 DIAGNOSIS — R11.2 NAUSEA AND VOMITING, UNSPECIFIED VOMITING TYPE: ICD-10-CM

## 2025-01-31 DIAGNOSIS — E10.65 HYPERGLYCEMIA DUE TO TYPE 1 DIABETES MELLITUS: Primary | ICD-10-CM

## 2025-01-31 LAB
ALBUMIN SERPL BCP-MCNC: 3.5 G/DL (ref 3.5–5)
ALBUMIN SERPL BCP-MCNC: 3.8 G/DL (ref 3.5–5)
ALBUMIN SERPL BCP-MCNC: 4.4 G/DL (ref 3.5–5)
ALBUMIN/GLOB SERPL: 0.9 {RATIO}
ALP SERPL-CCNC: 102 U/L (ref 40–150)
ALP SERPL-CCNC: 110 U/L (ref 40–150)
ALP SERPL-CCNC: 132 U/L (ref 40–150)
ALT SERPL W P-5'-P-CCNC: 16 U/L
ALT SERPL W P-5'-P-CCNC: 18 U/L
ALT SERPL W P-5'-P-CCNC: 22 U/L
ANION GAP SERPL CALCULATED.3IONS-SCNC: 18 MMOL/L (ref 7–16)
ANION GAP SERPL CALCULATED.3IONS-SCNC: 21 MMOL/L (ref 7–16)
ANION GAP SERPL CALCULATED.3IONS-SCNC: 23 MMOL/L (ref 7–16)
AST SERPL W P-5'-P-CCNC: 27 U/L (ref 5–34)
AST SERPL W P-5'-P-CCNC: 37 U/L (ref 5–34)
AST SERPL W P-5'-P-CCNC: 38 U/L (ref 5–34)
BASOPHILS # BLD AUTO: 0.01 K/UL (ref 0–0.2)
BASOPHILS NFR BLD AUTO: 0.1 % (ref 0–1)
BILIRUB SERPL-MCNC: 0.4 MG/DL
BILIRUB SERPL-MCNC: 0.5 MG/DL
BILIRUB SERPL-MCNC: 0.6 MG/DL
BILIRUB UR QL STRIP: NEGATIVE
BUN SERPL-MCNC: 10 MG/DL (ref 7–19)
BUN SERPL-MCNC: 12 MG/DL (ref 7–19)
BUN SERPL-MCNC: 8 MG/DL (ref 7–19)
BUN/CREAT SERPL: 11 (ref 6–20)
BUN/CREAT SERPL: 12 (ref 6–20)
BUN/CREAT SERPL: 13 (ref 6–20)
CALCIUM SERPL-MCNC: 10.4 MG/DL (ref 8.4–10.2)
CALCIUM SERPL-MCNC: 8.4 MG/DL (ref 8.4–10.2)
CALCIUM SERPL-MCNC: 9 MG/DL (ref 8.4–10.2)
CHLORIDE SERPL-SCNC: 102 MMOL/L (ref 98–107)
CHLORIDE SERPL-SCNC: 105 MMOL/L (ref 98–107)
CHLORIDE SERPL-SCNC: 98 MMOL/L (ref 98–107)
CLARITY UR: CLEAR
CO2 SERPL-SCNC: 15 MMOL/L (ref 22–29)
CO2 SERPL-SCNC: 16 MMOL/L (ref 22–29)
CO2 SERPL-SCNC: 18 MMOL/L (ref 22–29)
COLOR UR: YELLOW
CREAT SERPL-MCNC: 0.7 MG/DL (ref 0.55–1.02)
CREAT SERPL-MCNC: 0.78 MG/DL (ref 0.55–1.02)
CREAT SERPL-MCNC: 1.02 MG/DL (ref 0.55–1.02)
DIFFERENTIAL METHOD BLD: ABNORMAL
EGFR (NO RACE VARIABLE) (RUSH/TITUS): 110 ML/MIN/1.73M2
EGFR (NO RACE VARIABLE) (RUSH/TITUS): 126 ML/MIN/1.73M2
EGFR (NO RACE VARIABLE) (RUSH/TITUS): 80 ML/MIN/1.73M2
EOSINOPHIL # BLD AUTO: 0.07 K/UL (ref 0–0.5)
EOSINOPHIL NFR BLD AUTO: 0.8 % (ref 1–4)
ERYTHROCYTE [DISTWIDTH] IN BLOOD BY AUTOMATED COUNT: 18.5 % (ref 11.5–14.5)
GLOBULIN SER-MCNC: 3.7 G/DL (ref 2–4)
GLOBULIN SER-MCNC: 4.3 G/DL (ref 2–4)
GLOBULIN SER-MCNC: 4.9 G/DL (ref 2–4)
GLUCOSE SERPL-MCNC: 192 MG/DL (ref 74–100)
GLUCOSE SERPL-MCNC: 207 MG/DL (ref 74–100)
GLUCOSE SERPL-MCNC: 332 MG/DL (ref 70–105)
GLUCOSE SERPL-MCNC: 332 MG/DL (ref 74–100)
GLUCOSE UR STRIP-MCNC: 500 MG/DL
HCG UR QL IA.RAPID: NEGATIVE
HCO3 UR-SCNC: 18.3 MMOL/L (ref 21–28)
HCT VFR BLD AUTO: 39.4 % (ref 38–47)
HGB BLD-MCNC: 12.5 G/DL (ref 12–16)
KETONES UR STRIP-SCNC: >=160 MG/DL
LEUKOCYTE ESTERASE UR QL STRIP: NEGATIVE
LYMPHOCYTES # BLD AUTO: 1.29 K/UL (ref 1–4.8)
LYMPHOCYTES NFR BLD AUTO: 14.2 % (ref 27–41)
MCH RBC QN AUTO: 23.3 PG (ref 27–31)
MCHC RBC AUTO-ENTMCNC: 31.7 G/DL (ref 32–36)
MCV RBC AUTO: 73.5 FL (ref 80–96)
MONOCYTES # BLD AUTO: 0.18 K/UL (ref 0–0.8)
MONOCYTES NFR BLD AUTO: 2 % (ref 2–6)
MPC BLD CALC-MCNC: 9.5 FL (ref 9.4–12.4)
NEUTROPHILS # BLD AUTO: 7.51 K/UL (ref 1.8–7.7)
NEUTROPHILS NFR BLD AUTO: 82.9 % (ref 53–65)
NITRITE UR QL STRIP: NEGATIVE
PCO2 BLDA: 34 MMHG (ref 35–48)
PH SMN: 7.34 [PH] (ref 7.35–7.45)
PH UR STRIP: 5.5 PH UNITS
PLATELET # BLD AUTO: 203 K/UL (ref 150–400)
PO2 BLDA: 80 MMHG (ref 83–108)
POC BASE EXCESS: -6.6 MMOL/L (ref -2–3)
POC SATURATED O2: 95 %
POTASSIUM SERPL-SCNC: 3.9 MMOL/L (ref 3.5–5.1)
POTASSIUM SERPL-SCNC: 4.4 MMOL/L (ref 3.5–5.1)
POTASSIUM SERPL-SCNC: 5 MMOL/L (ref 3.5–5.1)
PROT SERPL-MCNC: 7.2 G/DL (ref 6.4–8.3)
PROT SERPL-MCNC: 8.1 G/DL (ref 6.4–8.3)
PROT SERPL-MCNC: 9.3 G/DL (ref 6.4–8.3)
PROT UR QL STRIP: NEGATIVE
RBC # BLD AUTO: 5.36 M/UL (ref 4.2–5.4)
RBC # UR STRIP: NEGATIVE /UL
SODIUM SERPL-SCNC: 134 MMOL/L (ref 136–145)
SODIUM SERPL-SCNC: 134 MMOL/L (ref 136–145)
SODIUM SERPL-SCNC: 135 MMOL/L (ref 136–145)
SP GR UR STRIP: 1.02
UROBILINOGEN UR STRIP-ACNC: 0.2 MG/DL
WBC # BLD AUTO: 9.06 K/UL (ref 4.5–11)

## 2025-01-31 PROCEDURE — 80053 COMPREHEN METABOLIC PANEL: CPT | Performed by: NURSE PRACTITIONER

## 2025-01-31 PROCEDURE — 99284 EMERGENCY DEPT VISIT MOD MDM: CPT | Mod: 25

## 2025-01-31 PROCEDURE — 81025 URINE PREGNANCY TEST: CPT | Performed by: NURSE PRACTITIONER

## 2025-01-31 PROCEDURE — 82962 GLUCOSE BLOOD TEST: CPT

## 2025-01-31 PROCEDURE — 96374 THER/PROPH/DIAG INJ IV PUSH: CPT

## 2025-01-31 PROCEDURE — 63600175 PHARM REV CODE 636 W HCPCS: Performed by: NURSE PRACTITIONER

## 2025-01-31 PROCEDURE — 85025 COMPLETE CBC W/AUTO DIFF WBC: CPT | Performed by: NURSE PRACTITIONER

## 2025-01-31 PROCEDURE — 99284 EMERGENCY DEPT VISIT MOD MDM: CPT | Mod: GF,,, | Performed by: NURSE PRACTITIONER

## 2025-01-31 PROCEDURE — 96361 HYDRATE IV INFUSION ADD-ON: CPT

## 2025-01-31 PROCEDURE — 36600 WITHDRAWAL OF ARTERIAL BLOOD: CPT

## 2025-01-31 PROCEDURE — 81003 URINALYSIS AUTO W/O SCOPE: CPT | Performed by: NURSE PRACTITIONER

## 2025-01-31 PROCEDURE — 82803 BLOOD GASES ANY COMBINATION: CPT

## 2025-01-31 PROCEDURE — 25000003 PHARM REV CODE 250: Performed by: NURSE PRACTITIONER

## 2025-01-31 RX ORDER — HYDRALAZINE HYDROCHLORIDE 20 MG/ML
10 INJECTION INTRAMUSCULAR; INTRAVENOUS
Status: COMPLETED | OUTPATIENT
Start: 2025-01-31 | End: 2025-01-31

## 2025-01-31 RX ADMIN — HYDRALAZINE HYDROCHLORIDE 10 MG: 20 INJECTION INTRAMUSCULAR; INTRAVENOUS at 03:01

## 2025-01-31 RX ADMIN — SODIUM CHLORIDE 1000 ML: 9 INJECTION, SOLUTION INTRAVENOUS at 02:01

## 2025-01-31 RX ADMIN — INSULIN HUMAN 5 UNITS: 100 INJECTION, SOLUTION PARENTERAL at 05:01

## 2025-01-31 RX ADMIN — SODIUM CHLORIDE 1000 ML: 9 INJECTION, SOLUTION INTRAVENOUS at 05:01

## 2025-01-31 NOTE — ED TRIAGE NOTES
24 year old wnwd female presents to ER with c/o n/v onset this morning around 0900. Pt states she has vomited x 3. Pt has not taken her blood pressure today  as it is 146/116. Pt has had her insulin PTA. Pt denies any distress and is very pleasant. Pt respirations are even and non labored, skin is warm and dry to touch. Pt ambulated to room two. Pt is AA&O x 4. Mom is at bedside.

## 2025-01-31 NOTE — ED PROVIDER NOTES
Encounter Date: 1/31/2025       History     Chief Complaint   Patient presents with    Nausea    Vomiting     24 yr old AAF with PMH of T1DM, HTN, CVA - craniotomy with residual left side weakness to ED with mother with c/o N/V upon awakening at 9 AM this morning. Mom states FSBG was 434. Mom treated with Lantus 22 u and Novolog 8 u. Repeat FSBG after treatment 332. Patient denies abdominal pain, diarrhea, dysuria/frequency/urgency/hematuria. States vomiting x 4 this AM. Normal BM yesterday. Patient denies current nausea at presentation. Patient has had frequent similar episodes with ED visits in the past that are usually correlated with beginning of menstrual cycle. LMP was 01/14/25. She has seen various specialist the past 6 months due to frequent episodes. She has been diagnosed with DKA twice during these ED visits. Her Farxiga was discontinued 4 months ago and she has not had an episode of DKA since that time. Presenting /116. Mom states patient has not taken her morning Lisinopril.    The history is provided by the patient and the spouse.     Review of patient's allergies indicates:  No Known Allergies  Past Medical History:   Diagnosis Date    Arterial ischemic stroke, MCA (middle cerebral artery), right, acute     Chronic headache disorder     [facility] Dr. Chaidez    Hemiparesis affecting left side as late effect of stroke     Hypertension     Type 1 diabetes mellitus      Past Surgical History:   Procedure Laterality Date    CRANIOTOMY  07/2022     No family history on file.  Social History     Tobacco Use    Smoking status: Every Day     Current packs/day: 0.50     Types: Cigarettes    Smokeless tobacco: Never   Substance Use Topics    Alcohol use: Never    Drug use: Yes     Frequency: 14.0 times per week     Types: Marijuana     Review of Systems   Constitutional:  Negative for chills and fever.   HENT: Negative.     Respiratory: Negative.  Negative for apnea, cough, choking,  chest tightness, shortness of breath, wheezing and stridor.    Cardiovascular: Negative.  Negative for chest pain, palpitations and leg swelling.   Gastrointestinal:  Positive for nausea and vomiting. Negative for abdominal distention, abdominal pain, anal bleeding, blood in stool, constipation, diarrhea and rectal pain.   Genitourinary: Negative.    Musculoskeletal: Negative.    Skin: Negative.    Neurological: Negative.    Psychiatric/Behavioral: Negative.     All other systems reviewed and are negative.      Physical Exam     Initial Vitals [01/31/25 1355]   BP Pulse Resp Temp SpO2   (!) 146/116 94 18 98.6 °F (37 °C) 99 %      MAP       --         Physical Exam    Nursing note and vitals reviewed.  Constitutional: She appears well-developed and well-nourished. No distress.   HENT:   Head: Normocephalic.   Eyes: Conjunctivae and EOM are normal. No scleral icterus.   Neck: Neck supple.   Normal range of motion.  Cardiovascular:  Normal rate, regular rhythm, normal heart sounds and intact distal pulses.     Exam reveals no gallop and no friction rub.       No murmur heard.  Pulmonary/Chest: Breath sounds normal. No respiratory distress. She has no wheezes. She has no rhonchi. She has no rales. She exhibits no tenderness.   Abdominal: Abdomen is soft. Bowel sounds are normal. She exhibits no distension and no mass. There is no abdominal tenderness. There is no rebound and no guarding.   Musculoskeletal:         General: No tenderness. Normal range of motion.      Cervical back: Normal range of motion and neck supple.     Neurological: She is alert and oriented to person, place, and time. She has normal strength.   Skin: Skin is warm and dry. Capillary refill takes less than 2 seconds.   Psychiatric: She has a normal mood and affect. Her behavior is normal. Judgment and thought content normal.         Medical Screening Exam   See Full Note    ED Course   Procedures  Labs Reviewed   COMPREHENSIVE METABOLIC PANEL -  Abnormal       Result Value    Sodium 135 (*)     Potassium 4.4      Chloride 98      CO2 18 (*)     Anion Gap 23 (*)     Glucose 332 (*)     BUN 12      Creatinine 1.02      BUN/Creatinine Ratio 12      Calcium 10.4 (*)     Total Protein 9.3 (*)     Albumin 4.4      Globulin 4.9 (*)     A/G Ratio 0.9      Bilirubin, Total 0.6      Alk Phos 132      ALT 22      AST 38 (*)     eGFR 80     CBC WITH DIFFERENTIAL - Abnormal    WBC 9.06      RBC 5.36      Hemoglobin 12.5      Hematocrit 39.4      MCV 73.5 (*)     MCH 23.3 (*)     MCHC 31.7 (*)     RDW 18.5 (*)     Platelet Count 203      MPV 9.5      Neutrophils % 82.9 (*)     Lymphocytes % 14.2 (*)     Neutrophils, Abs 7.51      Lymphocytes, Absolute 1.29      Diff Type Auto      Monocytes % 2.0      Eosinophils % 0.8 (*)     Basophils % 0.1      Monocytes, Absolute 0.18      Eosinophils, Absolute 0.07      Basophils, Absolute 0.01     URINALYSIS, REFLEX TO URINE CULTURE - Abnormal    Color, UA Yellow      Clarity, UA Clear      pH, UA 5.5      Leukocytes, UA Negative      Nitrites, UA Negative      Protein, UA Negative      Glucose,  (*)     Ketones, UA >=160 (*)     Urobilinogen, UA 0.2      Bilirubin, UA Negative      Blood, UA Negative      Specific Gravity, UA 1.025     COMPREHENSIVE METABOLIC PANEL - Abnormal    Sodium 134 (*)     Potassium 5.0      Chloride 102      CO2 16 (*)     Anion Gap 21 (*)     Glucose 207 (*)     BUN 10      Creatinine 0.78      BUN/Creatinine Ratio 13      Calcium 9.0      Total Protein 8.1      Albumin 3.8      Globulin 4.3 (*)     A/G Ratio 0.9      Bilirubin, Total 0.5      Alk Phos 110      ALT 18      AST 37 (*)     eGFR 110     COMPREHENSIVE METABOLIC PANEL - Abnormal    Sodium 134 (*)     Potassium 3.9      Chloride 105      CO2 15 (*)     Anion Gap 18 (*)     Glucose 192 (*)     BUN 8      Creatinine 0.70      BUN/Creatinine Ratio 11      Calcium 8.4      Total Protein 7.2      Albumin 3.5      Globulin 3.7      A/G Ratio  0.9      Bilirubin, Total 0.4      Alk Phos 102      ALT 16      AST 27      eGFR 126     POCT GLUCOSE MONITORING CONTINUOUS - Abnormal    POC Glucose 332 (*)    HCG QUALITATIVE URINE - Normal    HCG Qualitative, Urine Negative     CBC W/ AUTO DIFFERENTIAL    Narrative:     The following orders were created for panel order CBC auto differential.  Procedure                               Abnormality         Status                     ---------                               -----------         ------                     CBC with Differential[[id number]]       Abnormal            Final result                 Please view results for these tests on the individual orders.   POCT GLUCOSE MONITORING CONTINUOUS          Imaging Results    None          Medications   sodium chloride 0.9% bolus 1,000 mL 1,000 mL (0 mLs Intravenous Stopped 1/31/25 1548)   hydrALAZINE injection 10 mg (10 mg Intravenous Given 1/31/25 1515)   sodium chloride 0.9% bolus 1,000 mL 1,000 mL (0 mLs Intravenous Stopped 1/31/25 1905)   insulin regular injection 5 Units 0.05 mL (5 Units Intravenous Given 1/31/25 1723)     Medical Decision Making  24 yr old AAF with PMH of T1DM, HTN, CVA - craniotomy with residual left side weakness to ED with mother with c/o N/V upon awakening at 9 AM this morning. Mom states FSBG was 434. Mom treated with Lantus 22 u and Novolog 8 u. Repeat FSBG after treatment 332. Patient denies abdominal pain, diarrhea, dysuria/frequency/urgency/hematuria. States vomiting x 4 this AM. Normal BM yesterday. Patient denies current nausea at presentation. Patient has had frequent similar episodes with ED visits in the past that are usually correlated with beginning of menstrual cycle. LMP was 01/14/25. She has seen various specialist the past 6 months due to frequent episodes. She has been diagnosed with DKA twice during these ED visits. Her Farxiga was discontinued 4 months ago and she has not had an episode of DKA since that time.  Presenting /116. Mom states patient has not taken her morning Lisinopril.    Amount and/or Complexity of Data Reviewed  Labs: ordered.     Details: CBC: Unremarkable  CMP: Sodium 135, CO2 18, Anion Gap 23, Glucose 332: Repeat CMP Sodium 134, CO2 16, Anion Gap 21, Glucose 207; Sodium 134, CO2 15, Anion Gap 18, Glucose 192  POC Glucose: 332  ABGs: pH 7.34, PCO2 34, PO2 80, HCO3 18.3, Base Excess -6.6    Risk  OTC drugs.  Prescription drug management.                                      Clinical Impression:   Final diagnoses:  [E10.65] Hyperglycemia due to type 1 diabetes mellitus (Primary)  [R11.2] Nausea and vomiting, unspecified vomiting type        ED Disposition Condition    Discharge Stable          ED Prescriptions    None       Follow-up Information       Follow up With Specialties Details Why Contact Info    Carolyn Ch MD Family Medicine Go to  As needed, for follow up [address] [facility] [address]  [phone]               Rashaad Garcia FNP  01/31/25 1925       Rashaad Garcia FNP  01/31/25 1926

## 2025-02-05 ENCOUNTER — PATIENT OUTREACH (OUTPATIENT)
Facility: OTHER | Age: 25
End: 2025-02-05

## 2025-02-05 NOTE — PROGRESS NOTES
2/5/25  Ed navigator second attempt Telephone messaged call picked up and disconnected. Ed navigator return call no answer chart will be closed at this time.   Dinorah Pacheco LPN-ED Navigator  #[phone]

## 2025-02-12 ENCOUNTER — HOSPITAL ENCOUNTER (EMERGENCY)
Facility: HOSPITAL | Age: 25
Discharge: HOME | End: 2025-02-13
Payer: MEDICAID

## 2025-02-12 DIAGNOSIS — R11.0 NAUSEA: Primary | ICD-10-CM

## 2025-02-12 DIAGNOSIS — R10.9 ABDOMINAL PAIN, UNSPECIFIED ABDOMINAL LOCATION: ICD-10-CM

## 2025-02-12 DIAGNOSIS — E11.65 HYPERGLYCEMIA DUE TO DIABETES MELLITUS: ICD-10-CM

## 2025-02-12 LAB
ALBUMIN SERPL BCP-MCNC: 3.8 G/DL (ref 3.5–5)
ALBUMIN/GLOB SERPL: 0.9 {RATIO}
ALP SERPL-CCNC: 94 U/L (ref 40–150)
ALT SERPL W P-5'-P-CCNC: 12 U/L
AMPHET UR QL SCN: NEGATIVE
ANION GAP SERPL CALCULATED.3IONS-SCNC: 13 MMOL/L (ref 7–16)
AST SERPL W P-5'-P-CCNC: 17 U/L (ref 5–34)
BACTERIA #/AREA URNS HPF: ABNORMAL /HPF
BARBITURATES UR QL SCN: NEGATIVE
BASOPHILS # BLD AUTO: 0.01 K/UL (ref 0–0.2)
BASOPHILS NFR BLD AUTO: 0.1 % (ref 0–1)
BENZODIAZ METAB UR QL SCN: NEGATIVE
BILIRUB SERPL-MCNC: 0.4 MG/DL
BILIRUB UR QL STRIP: NEGATIVE
BUN SERPL-MCNC: 7 MG/DL (ref 7–19)
BUN/CREAT SERPL: 9 (ref 6–20)
CALCIUM SERPL-MCNC: 9.4 MG/DL (ref 8.4–10.2)
CANNABINOIDS UR QL SCN: POSITIVE
CHLORIDE SERPL-SCNC: 100 MMOL/L (ref 98–107)
CLARITY UR: ABNORMAL
CO2 SERPL-SCNC: 24 MMOL/L (ref 22–29)
COCAINE UR QL SCN: NEGATIVE
COLOR UR: ABNORMAL
CREAT SERPL-MCNC: 0.8 MG/DL (ref 0.55–1.02)
DIFFERENTIAL METHOD BLD: ABNORMAL
EGFR (NO RACE VARIABLE) (RUSH/TITUS): 107 ML/MIN/1.73M2
EOSINOPHIL # BLD AUTO: 0.01 K/UL (ref 0–0.5)
EOSINOPHIL NFR BLD AUTO: 0.1 % (ref 1–4)
ERYTHROCYTE [DISTWIDTH] IN BLOOD BY AUTOMATED COUNT: 18.2 % (ref 11.5–14.5)
GLOBULIN SER-MCNC: 4.2 G/DL (ref 2–4)
GLUCOSE SERPL-MCNC: 285 MG/DL (ref 74–100)
GLUCOSE SERPL-MCNC: 292 MG/DL (ref 70–105)
GLUCOSE UR STRIP-MCNC: >=1000 MG/DL
HCG UR QL IA.RAPID: NEGATIVE
HCO3 UR-SCNC: 25.6 MMOL/L (ref 24–28)
HCT VFR BLD AUTO: 34.7 % (ref 38–47)
HGB BLD-MCNC: 10.9 G/DL (ref 12–16)
KETONES UR STRIP-SCNC: ABNORMAL MG/DL
LACTATE SERPL-SCNC: 2.8 MMOL/L (ref 0.5–2.2)
LEUKOCYTE ESTERASE UR QL STRIP: NEGATIVE
LYMPHOCYTES # BLD AUTO: 0.99 K/UL (ref 1–4.8)
LYMPHOCYTES NFR BLD AUTO: 14.5 % (ref 27–41)
MCH RBC QN AUTO: 23.3 PG (ref 27–31)
MCHC RBC AUTO-ENTMCNC: 31.4 G/DL (ref 32–36)
MCV RBC AUTO: 74.3 FL (ref 80–96)
MONOCYTES # BLD AUTO: 0.18 K/UL (ref 0–0.8)
MONOCYTES NFR BLD AUTO: 2.6 % (ref 2–6)
MPC BLD CALC-MCNC: 9.7 FL (ref 9.4–12.4)
NEUTROPHILS # BLD AUTO: 5.63 K/UL (ref 1.8–7.7)
NEUTROPHILS NFR BLD AUTO: 82.7 % (ref 53–65)
NITRITE UR QL STRIP: NEGATIVE
OPIATES UR QL SCN: NEGATIVE
PCO2 BLDA: 36 MMHG (ref 41–51)
PCP UR QL SCN: NEGATIVE
PH SMN: 7.46 [PH] (ref 7.32–7.42)
PH UR STRIP: 6 PH UNITS
PLATELET # BLD AUTO: 224 K/UL (ref 150–400)
PO2 BLDA: 36 MMHG (ref 25–40)
POC BASE EXCESS: 1.9 MMOL/L (ref -2–3)
POC SATURATED O2: 73 %
POTASSIUM SERPL-SCNC: 3.9 MMOL/L (ref 3.5–5.1)
PROT SERPL-MCNC: 8 G/DL (ref 6.4–8.3)
PROT UR QL STRIP: 30
RBC # BLD AUTO: 4.67 M/UL (ref 4.2–5.4)
RBC # UR STRIP: ABNORMAL /UL
RBC #/AREA URNS HPF: ABNORMAL /HPF
SODIUM SERPL-SCNC: 133 MMOL/L (ref 136–145)
SP GR UR STRIP: 1.02
SQUAMOUS #/AREA URNS LPF: ABNORMAL /LPF
UROBILINOGEN UR STRIP-ACNC: 1 MG/DL
WBC # BLD AUTO: 6.82 K/UL (ref 4.5–11)
WBC #/AREA URNS HPF: ABNORMAL /HPF

## 2025-02-12 PROCEDURE — 85025 COMPLETE CBC W/AUTO DIFF WBC: CPT | Performed by: NURSE PRACTITIONER

## 2025-02-12 PROCEDURE — 96374 THER/PROPH/DIAG INJ IV PUSH: CPT

## 2025-02-12 PROCEDURE — 82962 GLUCOSE BLOOD TEST: CPT

## 2025-02-12 PROCEDURE — 96361 HYDRATE IV INFUSION ADD-ON: CPT

## 2025-02-12 PROCEDURE — 96375 TX/PRO/DX INJ NEW DRUG ADDON: CPT

## 2025-02-12 PROCEDURE — 99284 EMERGENCY DEPT VISIT MOD MDM: CPT | Mod: 25

## 2025-02-12 PROCEDURE — 80307 DRUG TEST PRSMV CHEM ANLYZR: CPT | Performed by: NURSE PRACTITIONER

## 2025-02-12 PROCEDURE — 82803 BLOOD GASES ANY COMBINATION: CPT

## 2025-02-12 PROCEDURE — 99284 EMERGENCY DEPT VISIT MOD MDM: CPT | Mod: ,,, | Performed by: NURSE PRACTITIONER

## 2025-02-12 PROCEDURE — 83605 ASSAY OF LACTIC ACID: CPT | Performed by: NURSE PRACTITIONER

## 2025-02-12 PROCEDURE — 63600175 PHARM REV CODE 636 W HCPCS: Performed by: NURSE PRACTITIONER

## 2025-02-12 PROCEDURE — 99900035 HC TECH TIME PER 15 MIN (STAT)

## 2025-02-12 PROCEDURE — 25000003 PHARM REV CODE 250: Performed by: NURSE PRACTITIONER

## 2025-02-12 PROCEDURE — 81025 URINE PREGNANCY TEST: CPT | Performed by: NURSE PRACTITIONER

## 2025-02-12 PROCEDURE — 81001 URINALYSIS AUTO W/SCOPE: CPT | Performed by: NURSE PRACTITIONER

## 2025-02-12 PROCEDURE — 80053 COMPREHEN METABOLIC PANEL: CPT | Performed by: NURSE PRACTITIONER

## 2025-02-12 RX ORDER — PROCHLORPERAZINE EDISYLATE 5 MG/ML
2.5 INJECTION INTRAMUSCULAR; INTRAVENOUS
Status: COMPLETED | OUTPATIENT
Start: 2025-02-12 | End: 2025-02-12

## 2025-02-12 RX ADMIN — SODIUM CHLORIDE 1000 ML: 9 INJECTION, SOLUTION INTRAVENOUS at 10:02

## 2025-02-12 RX ADMIN — PROCHLORPERAZINE EDISYLATE 2.5 MG: 5 INJECTION, SOLUTION INTRAMUSCULAR; INTRAVENOUS at 10:02

## 2025-02-12 RX ADMIN — INSULIN HUMAN 5 UNITS: 100 INJECTION, SOLUTION PARENTERAL at 11:02

## 2025-02-12 NOTE — Clinical Note
Verona Iniguez accompanied their child to the emergency department on 2/12/2025. They may return to work on 02/14/2025.      If you have any questions or concerns, please don't hesitate to call.      Aminta Rene, FNP

## 2025-02-13 VITALS
SYSTOLIC BLOOD PRESSURE: 137 MMHG | OXYGEN SATURATION: 100 % | RESPIRATION RATE: 16 BRPM | WEIGHT: 125 LBS | TEMPERATURE: 99 F | DIASTOLIC BLOOD PRESSURE: 84 MMHG | HEART RATE: 65 BPM | HEIGHT: 64 IN | BODY MASS INDEX: 21.34 KG/M2

## 2025-02-13 LAB
GLUCOSE SERPL-MCNC: 225 MG/DL (ref 70–105)
GLUCOSE SERPL-MCNC: 299 MG/DL (ref 70–105)
LACTATE SERPL-SCNC: 1.8 MMOL/L (ref 0.5–2.2)

## 2025-02-13 PROCEDURE — 96376 TX/PRO/DX INJ SAME DRUG ADON: CPT

## 2025-02-13 PROCEDURE — 82962 GLUCOSE BLOOD TEST: CPT

## 2025-02-13 PROCEDURE — 63600175 PHARM REV CODE 636 W HCPCS: Performed by: NURSE PRACTITIONER

## 2025-02-13 PROCEDURE — 25000003 PHARM REV CODE 250: Performed by: NURSE PRACTITIONER

## 2025-02-13 RX ORDER — PROCHLORPERAZINE EDISYLATE 5 MG/ML
2.5 INJECTION INTRAMUSCULAR; INTRAVENOUS
Status: COMPLETED | OUTPATIENT
Start: 2025-02-13 | End: 2025-02-13

## 2025-02-13 RX ORDER — ACETAMINOPHEN 500 MG
1000 TABLET ORAL
Status: COMPLETED | OUTPATIENT
Start: 2025-02-13 | End: 2025-02-13

## 2025-02-13 RX ORDER — PROMETHAZINE HYDROCHLORIDE 25 MG/1
25 TABLET ORAL EVERY 6 HOURS PRN
Qty: 15 TABLET | Refills: 0 | Status: SHIPPED | OUTPATIENT
Start: 2025-02-13

## 2025-02-13 RX ADMIN — ACETAMINOPHEN 1000 MG: 500 TABLET ORAL at 01:02

## 2025-02-13 RX ADMIN — PROCHLORPERAZINE EDISYLATE 2.5 MG: 5 INJECTION, SOLUTION INTRAMUSCULAR; INTRAVENOUS at 01:02

## 2025-02-13 NOTE — ED TRIAGE NOTES
Pt presents to the ED with complaints of abdominal pain related to menstrual cramping. Pt states she started her cycle today and she has been nauseated and has taken zofran 4mg x 3, dicyclomine 20mg x 1 and motrin 600 mg and motrin 800mg and she is still having pain. Pt states she also has been smoked weed today. Pt states she has not been able to eat much today due to her nausea and she is a diabetic. Pt rates her pain a 5/10. Pt states she has a appointment with GYN on March 19, 2025.

## 2025-02-13 NOTE — ED PROVIDER NOTES
Encounter Date: 2/12/2025       History     Chief Complaint   Patient presents with    Abdominal Pain     Related to menstrual starting today     24 yr old AAF with PMH of HTN, T1DM, CVA with left side weakness to ED with c/o abdominal pain, nausea today, denies vomiting. Reports has taken ibuprofen and zofran with mild relief.  Pt reports has not been able to eat today due to nausea.  Pt states she started period today.      The history is provided by the patient and a parent.   Abdominal Pain  The current episode started today. The problem has not changed since onset.The abdominal pain is generalized. The abdominal pain does not radiate. The other symptoms of the illness include nausea and vomiting. The other symptoms of the illness do not include diarrhea.   Nausea began today.   The vomiting began today. The emesis contains stomach contents.     Review of patient's allergies indicates:  No Known Allergies  Past Medical History:   Diagnosis Date    Arterial ischemic stroke, MCA (middle cerebral artery), right, acute     Chronic headache disorder     [facility] Dr. Chaidez    Hemiparesis affecting left side as late effect of stroke     Hypertension     Type 1 diabetes mellitus      Past Surgical History:   Procedure Laterality Date    CRANIOTOMY  07/2022     No family history on file.  Social History     Tobacco Use    Smoking status: Every Day     Types: Cigars    Smokeless tobacco: Never   Substance Use Topics    Alcohol use: Never    Drug use: Yes     Frequency: 14.0 times per week     Types: Marijuana     Review of Systems   Constitutional: Negative.    Respiratory: Negative.     Cardiovascular: Negative.    Gastrointestinal:  Positive for abdominal pain, nausea and vomiting. Negative for diarrhea.   Genitourinary: Negative.        Physical Exam     Initial Vitals [02/12/25 2207]   BP Pulse Resp Temp SpO2   (!) 145/94 89 20 97.5 °F (36.4 °C) 100 %      MAP       --         Physical  Exam    Nursing note and vitals reviewed.  Cardiovascular:  Normal rate and regular rhythm.           Pulmonary/Chest: Breath sounds normal.   Abdominal: Abdomen is soft. There is generalized abdominal tenderness.     Neurological: She is alert and oriented to person, place, and time. GCS score is 15. GCS eye subscore is 4. GCS verbal subscore is 5. GCS motor subscore is 6.   Skin: Skin is warm and dry.   Psychiatric: She has a normal mood and affect.         Medical Screening Exam   See Full Note    ED Course   Procedures  Labs Reviewed   COMPREHENSIVE METABOLIC PANEL - Abnormal       Result Value    Sodium 133 (*)     Potassium 3.9      Chloride 100      CO2 24      Anion Gap 13      Glucose 285 (*)     BUN 7      Creatinine 0.80      BUN/Creatinine Ratio 9      Calcium 9.4      Total Protein 8.0      Albumin 3.8      Globulin 4.2 (*)     A/G Ratio 0.9      Bilirubin, Total 0.4      Alk Phos 94      ALT 12      AST 17      eGFR 107     URINALYSIS - Abnormal    Color, UA Red (*)     Clarity, UA Cloudy      pH, UA 6.0      Leukocytes, UA Negative      Nitrites, UA Negative      Protein, UA 30 (*)     Glucose, UA >=1000 (*)     Ketones, UA Trace      Urobilinogen, UA 1.0      Bilirubin, UA Negative      Blood, UA Large (*)     Specific [place], UA 1.020     DRUG SCREEN, URINE (BEAKER) - Abnormal    Barbiturates, Urine Negative      Benzodiazepine, Urine Negative      Opiates, Urine Negative      Phencyclidine, Urine Negative      Amphetamine, Urine Negative      Cannabinoid, Urine Positive (*)     Cocaine, Urine Negative      Narrative:     This screen includes the following classes of drugs at the listed cut-off:    Benzodiazepines 200 ng/ml  Cocaine metabolite 300 ng/ml  Opiates 2000 ng/ml  Barbiturates 200 ng/ml  Amphetamines 500 ng/ml  Marijuana metabs (THC) 50 ng/ml  Phencyclidine (PCP) 25 ng/ml    This is a screening test. If results do not correlate with clinical presentation, then a confirmatory send out test  is advised.   CBC WITH DIFFERENTIAL - Abnormal    WBC 6.82      RBC 4.67      Hemoglobin 10.9 (*)     Hematocrit 34.7 (*)     MCV 74.3 (*)     MCH 23.3 (*)     MCHC 31.4 (*)     RDW 18.2 (*)     Platelet Count 224      MPV 9.7      Neutrophils % 82.7 (*)     Lymphocytes % 14.5 (*)     Neutrophils, Abs 5.63      Lymphocytes, Absolute 0.99 (*)     Diff Type Auto      Monocytes % 2.6      Eosinophils % 0.1 (*)     Basophils % 0.1      Monocytes, Absolute 0.18      Eosinophils, Absolute 0.01      Basophils, Absolute 0.01     LACTIC ACID, PLASMA - Abnormal    Lactic Acid 2.8 (*)    URINALYSIS, MICROSCOPIC - Abnormal    WBC, UA 0-5      RBC, UA Too Numerous To Count (*)     Bacteria, UA Few (*)     Squamous Epithelial Cells, UA Few (*)    POCT GLUCOSE MONITORING CONTINUOUS - Abnormal    POC Glucose 292 (*)    POCT GLUCOSE MONITORING CONTINUOUS - Abnormal    POC Glucose 299 (*)    POCT GLUCOSE MONITORING CONTINUOUS - Abnormal    POC Glucose 225 (*)    HCG QUALITATIVE URINE - Normal    HCG Qualitative, Urine Negative     LACTIC ACID, PLASMA - Normal    Lactic Acid 1.8     CBC W/ AUTO DIFFERENTIAL    Narrative:     The following orders were created for panel order CBC Auto Differential.  Procedure                               Abnormality         Status                     ---------                               -----------         ------                     CBC with Differential[[id number]]       Abnormal            Final result                 Please view results for these tests on the individual orders.   POCT GLUCOSE MONITORING CONTINUOUS          Imaging Results    None          Medications   prochlorperazine injection Soln 2.5 mg (2.5 mg Intravenous Given 2/12/25 2248)   sodium chloride 0.9% bolus 1,000 mL 1,000 mL (0 mLs Intravenous Stopped 2/12/25 2347)   insulin regular injection 5 Units 0.05 mL (5 Units Intravenous Given 2/12/25 2357)   acetaminophen tablet 1,000 mg (1,000 mg Oral Given 2/13/25 0119)    prochlorperazine injection Soln 2.5 mg (2.5 mg Intravenous Given 2/13/25 0119)     Medical Decision Making  24 yr old AAF with PMH of HTN, T1DM, CVA with left side weakness to ED with c/o abdominal pain, nausea today, denies vomiting. Reports has taken ibuprofen and zofran with mild relief.  Pt reports has not been able to eat today due to nausea.  Pt states she started period today.    BS elevated - pt reports has not been able to eat today.  Mother reports she was supposed to see endocrinologist today but they had to change the appointment because she was sick.      Symptoms relieved with compazine, tylenol, IV fluids, BS improved with IV fluids and insulin.     Amount and/or Complexity of Data Reviewed  Labs: ordered. Decision-making details documented in ED Course.    Risk  OTC drugs.  Prescription drug management.               ED Course as of 02/13/25 0140   Wed Feb 12, 2025   2243 RBC, UA(!): Too Numerous To Count  Pt having menstrual cycle [CG]   2354  after IV NS 1000ml.  Mother states she ate a little grits before coming to the ED. Will treat with regular insulin 5 units   [CG]   2357 Pt reports nausea and abdominal pain have improved.   [CG]   Thu Feb 13, 2025   0116 C/o headache.   [CG]   0139 Reports headache is easing off.  Pt and mother request DC home.  Will prepare for DC [CG]      ED Course User Index  [CG] Aminta Rene FNP                           Clinical Impression:   Final diagnoses:  [R11.0] Nausea (Primary)  [R10.9] Abdominal pain, unspecified abdominal location  [E11.65] Hyperglycemia due to diabetes mellitus        ED Disposition Condition    Discharge Stable          ED Prescriptions       Medication Sig Dispense Start Date End Date Auth. Provider    promethazine (PHENERGAN) 25 MG tablet Take 1 tablet (25 mg total) by mouth every 6 (six) hours as needed for Nausea. 15 tablet 2/13/2025 -- Aminta Rene FNP          Follow-up Information       Follow up With Specialties  Details Why Contact Info    Carolyn Ch MD Family Medicine Go in 2 days  [address] [facility] [address]  [phone]               Aminta Rene, [facility]  02/13/25 0140

## 2025-02-13 NOTE — DISCHARGE INSTRUCTIONS
Take tylenol or ibuprofen as directed if needed for headache.  Take phenergan as directed if needed for nausea.  Follow up with your primary care in 2-3 days.  Keep appointment with endocrinologist and GYN.  Return for worsening condition or emergency needs.

## 2025-02-27 ENCOUNTER — PATIENT OUTREACH (OUTPATIENT)
Facility: OTHER | Age: 25
End: 2025-02-27

## 2025-02-27 NOTE — PROGRESS NOTES
2/27/25  Ed navigator second outreach attempt. Ed navigator called number listed patient not at number unable to leave a message chart will be closed at this time   Dinorah Pacheco LPN-ED Navigator  #[phone]

## 2025-03-19 ENCOUNTER — HOSPITAL ENCOUNTER (EMERGENCY)
Facility: HOSPITAL | Age: 25
Discharge: HOME | End: 2025-03-19
Payer: MEDICAID

## 2025-03-19 VITALS
HEIGHT: 64 IN | WEIGHT: 125 LBS | TEMPERATURE: 98 F | HEART RATE: 71 BPM | RESPIRATION RATE: 20 BRPM | DIASTOLIC BLOOD PRESSURE: 102 MMHG | SYSTOLIC BLOOD PRESSURE: 160 MMHG | BODY MASS INDEX: 21.34 KG/M2 | OXYGEN SATURATION: 100 %

## 2025-03-19 DIAGNOSIS — R51.9 ACUTE NONINTRACTABLE HEADACHE, UNSPECIFIED HEADACHE TYPE: ICD-10-CM

## 2025-03-19 DIAGNOSIS — I10 HYPERTENSION, UNSPECIFIED TYPE: Primary | ICD-10-CM

## 2025-03-19 LAB
ALBUMIN SERPL BCP-MCNC: 4.4 G/DL (ref 3.5–5)
ALBUMIN/GLOB SERPL: 0.9 {RATIO}
ALP SERPL-CCNC: 122 U/L (ref 40–150)
ALT SERPL W P-5'-P-CCNC: 14 U/L
ANION GAP SERPL CALCULATED.3IONS-SCNC: 19 MMOL/L (ref 7–16)
APTT PPP: 27.3 SECONDS (ref 25.2–37.3)
AST SERPL W P-5'-P-CCNC: 21 U/L (ref 11–45)
BACTERIA #/AREA URNS HPF: ABNORMAL /HPF
BASOPHILS # BLD AUTO: 0.01 K/UL (ref 0–0.2)
BASOPHILS NFR BLD AUTO: 0.1 % (ref 0–1)
BILIRUB SERPL-MCNC: 0.6 MG/DL
BILIRUB UR QL STRIP: NEGATIVE
BUN SERPL-MCNC: 8 MG/DL (ref 7–19)
BUN/CREAT SERPL: 9 (ref 6–20)
CALCIUM SERPL-MCNC: 10 MG/DL (ref 8.4–10.2)
CHLORIDE SERPL-SCNC: 98 MMOL/L (ref 98–107)
CLARITY UR: CLEAR
CO2 SERPL-SCNC: 21 MMOL/L (ref 22–29)
COLOR UR: YELLOW
CREAT SERPL-MCNC: 0.93 MG/DL (ref 0.55–1.02)
DIFFERENTIAL METHOD BLD: ABNORMAL
EGFR (NO RACE VARIABLE) (RUSH/TITUS): 89 ML/MIN/1.73M2
EOSINOPHIL # BLD AUTO: 0.09 K/UL (ref 0–0.5)
EOSINOPHIL NFR BLD AUTO: 0.9 % (ref 1–4)
ERYTHROCYTE [DISTWIDTH] IN BLOOD BY AUTOMATED COUNT: 18.5 % (ref 11.5–14.5)
GLOBULIN SER-MCNC: 4.7 G/DL (ref 2–4)
GLUCOSE SERPL-MCNC: 372 MG/DL (ref 74–100)
GLUCOSE UR STRIP-MCNC: >1000 MG/DL
HCG UR QL IA.RAPID: NEGATIVE
HCT VFR BLD AUTO: 41.7 % (ref 38–47)
HGB BLD-MCNC: 13.3 G/DL (ref 12–16)
INR BLD: 1.17
KETONES UR STRIP-SCNC: 15 MG/DL
LEUKOCYTE ESTERASE UR QL STRIP: NEGATIVE
LYMPHOCYTES # BLD AUTO: 1.64 K/UL (ref 1–4.8)
LYMPHOCYTES NFR BLD AUTO: 15.9 % (ref 27–41)
MCH RBC QN AUTO: 24.3 PG (ref 27–31)
MCHC RBC AUTO-ENTMCNC: 31.9 G/DL (ref 32–36)
MCV RBC AUTO: 76.2 FL (ref 80–96)
MONOCYTES # BLD AUTO: 0.3 K/UL (ref 0–0.8)
MONOCYTES NFR BLD AUTO: 2.9 % (ref 2–6)
MPC BLD CALC-MCNC: 9.7 FL (ref 9.4–12.4)
NEUTROPHILS # BLD AUTO: 8.28 K/UL (ref 1.8–7.7)
NEUTROPHILS NFR BLD AUTO: 80.2 % (ref 53–65)
NITRITE UR QL STRIP: NEGATIVE
PH UR STRIP: 6 PH UNITS
PLATELET # BLD AUTO: 272 K/UL (ref 150–400)
POTASSIUM SERPL-SCNC: 4.1 MMOL/L (ref 3.5–5.1)
PROT SERPL-MCNC: 9.1 G/DL (ref 6.4–8.3)
PROT UR QL STRIP: 100
PROTHROMBIN TIME: 15.1 SECONDS (ref 11.7–14.7)
RBC # BLD AUTO: 5.47 M/UL (ref 4.2–5.4)
RBC # UR STRIP: ABNORMAL /UL
RBC #/AREA URNS HPF: ABNORMAL /HPF
SODIUM SERPL-SCNC: 134 MMOL/L (ref 136–145)
SP GR UR STRIP: 1.02
SQUAMOUS #/AREA URNS LPF: ABNORMAL /LPF
UROBILINOGEN UR STRIP-ACNC: 1 MG/DL
WBC # BLD AUTO: 10.32 K/UL (ref 4.5–11)
WBC #/AREA URNS HPF: ABNORMAL /HPF

## 2025-03-19 PROCEDURE — 85730 THROMBOPLASTIN TIME PARTIAL: CPT | Performed by: NURSE PRACTITIONER

## 2025-03-19 PROCEDURE — 80053 COMPREHEN METABOLIC PANEL: CPT | Performed by: NURSE PRACTITIONER

## 2025-03-19 PROCEDURE — 81003 URINALYSIS AUTO W/O SCOPE: CPT | Performed by: NURSE PRACTITIONER

## 2025-03-19 PROCEDURE — 85610 PROTHROMBIN TIME: CPT | Performed by: NURSE PRACTITIONER

## 2025-03-19 PROCEDURE — 81025 URINE PREGNANCY TEST: CPT | Performed by: NURSE PRACTITIONER

## 2025-03-19 PROCEDURE — 85025 COMPLETE CBC W/AUTO DIFF WBC: CPT | Performed by: NURSE PRACTITIONER

## 2025-03-19 PROCEDURE — 99284 EMERGENCY DEPT VISIT MOD MDM: CPT | Mod: ,,, | Performed by: NURSE PRACTITIONER

## 2025-03-19 PROCEDURE — 99284 EMERGENCY DEPT VISIT MOD MDM: CPT | Mod: 25

## 2025-03-19 PROCEDURE — 25000003 PHARM REV CODE 250: Performed by: NURSE PRACTITIONER

## 2025-03-19 RX ORDER — HYDRALAZINE HYDROCHLORIDE 25 MG/1
50 TABLET, FILM COATED ORAL
Status: COMPLETED | OUTPATIENT
Start: 2025-03-19 | End: 2025-03-19

## 2025-03-19 RX ORDER — INSULIN GLARGINE 100 [IU]/ML
22 INJECTION, SOLUTION SUBCUTANEOUS
COMMUNITY

## 2025-03-19 RX ORDER — LISINOPRIL 10 MG/1
10 TABLET ORAL 2 TIMES DAILY
COMMUNITY

## 2025-03-19 RX ORDER — HYDROCODONE BITARTRATE AND ACETAMINOPHEN 5; 325 MG/1; MG/1
1 TABLET ORAL
Status: COMPLETED | OUTPATIENT
Start: 2025-03-19 | End: 2025-03-19

## 2025-03-19 RX ORDER — LEVETIRACETAM 500 MG/1
500 TABLET ORAL 2 TIMES DAILY
COMMUNITY
Start: 2024-10-23

## 2025-03-19 RX ORDER — METOCLOPRAMIDE HYDROCHLORIDE 5 MG/ML
10 INJECTION INTRAMUSCULAR; INTRAVENOUS
Status: DISCONTINUED | OUTPATIENT
Start: 2025-03-19 | End: 2025-03-19 | Stop reason: HOSPADM

## 2025-03-19 RX ORDER — HYDROCODONE BITARTRATE AND ACETAMINOPHEN 7.5; 325 MG/15ML; MG/15ML
5 SOLUTION ORAL
Status: DISCONTINUED | OUTPATIENT
Start: 2025-03-19 | End: 2025-03-19 | Stop reason: HOSPADM

## 2025-03-19 RX ORDER — AMLODIPINE BESYLATE 5 MG/1
10 TABLET ORAL DAILY
Qty: 30 TABLET | Refills: 0 | Status: SHIPPED | OUTPATIENT
Start: 2025-03-19 | End: 2026-03-19

## 2025-03-19 RX ORDER — METOPROLOL TARTRATE 1 MG/ML
5 INJECTION, SOLUTION INTRAVENOUS
Status: DISCONTINUED | OUTPATIENT
Start: 2025-03-19 | End: 2025-03-19 | Stop reason: HOSPADM

## 2025-03-19 RX ORDER — AMLODIPINE BESYLATE 5 MG/1
5 TABLET ORAL
Status: COMPLETED | OUTPATIENT
Start: 2025-03-19 | End: 2025-03-19

## 2025-03-19 RX ADMIN — AMLODIPINE BESYLATE 5 MG: 5 TABLET ORAL at 07:03

## 2025-03-19 RX ADMIN — HYDROCODONE BITARTRATE AND ACETAMINOPHEN 1 TABLET: 5; 325 TABLET ORAL at 05:03

## 2025-03-19 RX ADMIN — HYDRALAZINE HYDROCHLORIDE 50 MG: 25 TABLET ORAL at 05:03

## 2025-03-19 NOTE — ED NOTES
Multiple IV attempts with no success. Radiology at bedside for transport to CT. Pt to CT. Will attempt again once pt back in room.

## 2025-03-19 NOTE — ED NOTES
Multiple IV attempts made by both RN's with no success. JULES Eastman notified and lab notified for blood draw.

## 2025-03-19 NOTE — ED PROVIDER NOTES
Encounter Date: 3/19/2025       History     Chief Complaint   Patient presents with    Headache     C/o headache since about 12pm today. States it started after her dr's appointment. States she had a headache yesterday and her blood pressure was high so she took her 2nd lisinopril. States she was told to start taking x2 daily rather than x1 daily. States she took her dosage x1 around 12pm today and took another x1 around 1600 today with no relief.      Patient presents today with complaint of headache that began around noon today.  States her blood pressure is also elevated.  Reports seeing her neurologist today and he was changing her medications.  Is taking her off of Keppra and starting her on Vimpat.  They report they have not started this medication as of yet.  She reports compliance with her blood pressure medications.  She has no complaints of vomiting though has some nausea.  Denies any other contributing factors        Review of patient's allergies indicates:  No Known Allergies  Past Medical History:   Diagnosis Date    Arterial ischemic stroke, MCA (middle cerebral artery), right, acute     Chronic headache disorder     [facility] Dr. Chaidez    Hemiparesis affecting left side as late effect of stroke     Hypertension     Type 1 diabetes mellitus      Past Surgical History:   Procedure Laterality Date    CRANIOTOMY  07/2022     No family history on file.  Social History[1]  Review of Systems   Constitutional: Negative.    Respiratory: Negative.     Cardiovascular: Negative.    All other systems reviewed and are negative.      Physical Exam     Initial Vitals [03/19/25 1626]   BP Pulse Resp Temp SpO2   (!) 181/113 (!) 55 16 98.3 °F (36.8 °C) 98 %      MAP       --         Physical Exam    Nursing note and vitals reviewed.  Constitutional: She appears well-developed and well-nourished.   Eyes: EOM are normal. Pupils are equal, round, and reactive to light.   Cardiovascular:  Normal  rate, regular rhythm and normal heart sounds.           No murmur heard.  Pulmonary/Chest: Breath sounds normal. No respiratory distress.   Abdominal: Abdomen is soft. Bowel sounds are normal.   Musculoskeletal:         General: No tenderness or edema. Normal range of motion.     Neurological: She is alert and oriented to person, place, and time. She has normal strength. GCS score is 15. GCS eye subscore is 4. GCS verbal subscore is 5. GCS motor subscore is 6.   Skin: Skin is warm and dry.   Psychiatric: She has a normal mood and affect.         Medical Screening Exam   See Full Note    ED Course   Procedures  Labs Reviewed   URINALYSIS, REFLEX TO URINE CULTURE - Abnormal       Result Value    Color, UA Yellow      Clarity, UA Clear      pH, UA 6.0      Leukocytes, UA Negative      Nitrites, UA Negative      Protein,  (*)     Glucose, UA >1000 (*)     Ketones, UA 15 (*)     Urobilinogen, UA 1.0      Bilirubin, UA Negative      Blood, UA Large (*)     Specific Gravity, UA 1.020     COMPREHENSIVE METABOLIC PANEL - Abnormal    Sodium 134 (*)     Potassium 4.1      Chloride 98      CO2 21 (*)     Anion Gap 19 (*)     Glucose 372 (*)     BUN 8      Creatinine 0.93      BUN/Creatinine Ratio 9      Calcium 10.0      Total Protein 9.1 (*)     Albumin 4.4      Globulin 4.7 (*)     A/G Ratio 0.9      Bilirubin, Total 0.6      Alk Phos 122      ALT 14      AST 21      eGFR 89     CBC WITH DIFFERENTIAL - Abnormal    WBC 10.32      RBC 5.47 (*)     Hemoglobin 13.3      Hematocrit 41.7      MCV 76.2 (*)     MCH 24.3 (*)     MCHC 31.9 (*)     RDW 18.5 (*)     Platelet Count 272      MPV 9.7      Neutrophils % 80.2 (*)     Lymphocytes % 15.9 (*)     Neutrophils, Abs 8.28 (*)     Lymphocytes, Absolute 1.64      Diff Type Auto      Monocytes % 2.9      Eosinophils % 0.9 (*)     Basophils % 0.1      Monocytes, Absolute 0.30      Eosinophils, Absolute 0.09      Basophils, Absolute 0.01     PROTIME-INR - Abnormal    PT 15.1 (*)      INR 1.17     URINALYSIS, MICROSCOPIC - Abnormal    WBC, UA 0-5      RBC, UA 5-10 (*)     Bacteria, UA Few (*)     Squamous Epithelial Cells, UA Moderate (*)    HCG QUALITATIVE URINE - Normal    HCG Qualitative, Urine Negative     APTT - Normal    PTT 27.3     CBC W/ AUTO DIFFERENTIAL    Narrative:     The following orders were created for panel order CBC Auto Differential.  Procedure                               Abnormality         Status                     ---------                               -----------         ------                     CBC with Differential[[id number]]       Abnormal            Final result                 Please view results for these tests on the individual orders.          Imaging Results              CT Head Without Contrast (Final result)  Result time 03/19/25 17:37:58      Final result by Alonso Leahy MD (03/19/25 17:37:58)                   Impression:      1. No acute intracranial process with no significant change.  2. Postop changes on the right with right MCA distribution encephalomalacia involving portions of the right frontal, parietal and right temporal lobes may be associated with prior infarction.  Recommend clinical correlation.      Electronically signed by: Alonso Leahy  Date:    03/19/2025  Time:    17:37               Narrative:    EXAMINATION:  CT HEAD WITHOUT CONTRAST    CLINICAL HISTORY:  Headache, sudden, severe;    TECHNIQUE:  Low dose axial CT images obtained throughout the head without intravenous contrast. Sagittal and coronal reconstructions were performed.    COMPARISON:  01/06/2025    FINDINGS:  Intracranial compartment:    No acute hydrocephalus.  Minimal midline shift to the right associated with encephalomalacia of the right cerebrum.  Stable compensatory enlargement of the right lateral ventricle.  No extra-axial blood or fluid collections.    Involutional changes with chronic microvascular ischemic changes.  Craniotomy defect on the right with  right MCA distribution encephalomalacia involving portions of the right frontal, parietal and right temporal lobes.    No parenchymal mass, hemorrhage, edema or acute major vascular distribution infarct.    Skull/extracranial contents (limited evaluation): No fracture. Mastoid air cells and paranasal sinuses are essentially clear.                                       Medications   metoclopramide injection 10 mg (10 mg Intravenous Not Given 3/19/25 1645)   metoprolol injection 5 mg (5 mg Intravenous Not Given 3/19/25 1645)   HYDROcodone-acetaminophen 7.5-325 mg/15 mL oral liquid 5 mL (5 mLs Oral Not Given 3/19/25 1745)   amLODIPine tablet 5 mg (has no administration in time range)   hydrALAZINE tablet 50 mg (50 mg Oral Given 3/19/25 1759)   HYDROcodone-acetaminophen 5-325 mg per tablet 1 tablet (1 tablet Oral Given 3/19/25 1759)     Medical Decision Making  Amount and/or Complexity of Data Reviewed  Labs: ordered.  Radiology: ordered.    Risk  Prescription drug management.               ED Course as of 03/19/25 1901   Wed Mar 19, 2025   1638 Patient has a history of intracranial hemorrhage with craniectomy last year.  She has complaints of a headache along with a blood pressure of 181/113.  With this history we will get a CT head without contrast, obtain CBC, CMP, PT, PTT and a urinalysis.  Patient is a GCS 15 at this time.  We will treat her headache is a migraine and give her 10 of Reglan IV push along with Lopressor to help bring down her blood pressure. [BC]   1816 CT head shows no acute intracranial pathology. [BC]   1816 Patient has been stuck multiple times that we are unable to get an IV or labs.  Patient does have a glucose monitor that shows blood sugar has been well-controlled in his currently in the low 200s.  Headache is improved however continued to have problems with her blood pressure.  We are attempting to lower blood pressure with p.o. medications at this time. [BC]   1859 I have reviewed all  patient's labs.  Her blood pressure has improved now 160/102.  She states her headache has completely resolved.  We will give her a prescription for Norvasc 5 mg p.o. to be taken at night and give her 1st dose here in the ER.  She has an appointment with her primary care provider on Monday.  I have encouraged her mother to return to the emergency department should she have any issues between now and then. [BC]      ED Course User Index  [BC] Tonny Eastman NP                           Clinical Impression:   Final diagnoses:  [I10] Hypertension, unspecified type (Primary)  [R51.9] Acute nonintractable headache, unspecified headache type        ED Disposition Condition    Discharge Stable          ED Prescriptions       Medication Sig Dispense Start Date End Date Auth. Provider    amLODIPine (NORVASC) 5 MG tablet Take 2 tablets (10 mg total) by mouth once daily. 30 tablet 3/19/2025 3/19/2026 Tonny Eastman NP          Follow-up Information       Follow up With Specialties Details Why Contact Info    aCrolyn Ch MD Family Medicine Schedule an appointment as soon as possible for a visit in 2 days Follow-up of today's visit [address] [facility] [address] [facility] [address]  [phone]                 [1]   Social History  Tobacco Use    Smoking status: Every Day     Types: Cigars    Smokeless tobacco: Never   Substance Use Topics    Alcohol use: Never    Drug use: Yes     Frequency: 14.0 times per week     Types: Marijuana        Tonny Eastman NP  03/19/25 [id number]

## 2025-03-20 NOTE — ED NOTES
Discharge instructions given and explained to pt and she verbalized her understanding. Pt's mother is here to drive pt home.

## 2025-03-20 NOTE — DISCHARGE INSTRUCTIONS
Get Norvasc filled and take every night at bedtime.  Continue to take your other blood pressure medicine as directed.  Follow up with your primary care provider on Monday as planned.  Return to the emergency department should you have any concerns

## 2025-03-21 ENCOUNTER — PATIENT OUTREACH (OUTPATIENT)
Facility: OTHER | Age: 25
End: 2025-03-21
Payer: MEDICAID

## 2025-03-21 NOTE — PROGRESS NOTES
3/21/25  Ed navigator first outreach enrollment attempt-unsuccessful  Telephone message you have reach Verona Iniguez I am unable to take your call at the tone leave a message  Ed navigator left message to return call to [phone]  Dinorah Pacheco LPN-ED Navigator  Ph#[phone]     3/24/25  Ed navigator second outreach attempt-unsuccessful  Ed navigator unable to leave a message chart will be closed at this time  Dinorah Pacheco LPN-ED Navigator  Ph#[phone]